# Patient Record
Sex: MALE | Race: WHITE | NOT HISPANIC OR LATINO | Employment: OTHER | URBAN - METROPOLITAN AREA
[De-identification: names, ages, dates, MRNs, and addresses within clinical notes are randomized per-mention and may not be internally consistent; named-entity substitution may affect disease eponyms.]

---

## 2017-01-19 DIAGNOSIS — R07.89 TIGHTNESS IN CHEST: Primary | ICD-10-CM

## 2017-01-23 ENCOUNTER — OFFICE VISIT (OUTPATIENT)
Dept: CARDIOLOGY | Facility: CLINIC | Age: 72
End: 2017-01-23
Payer: MEDICARE

## 2017-01-23 ENCOUNTER — PATIENT MESSAGE (OUTPATIENT)
Dept: CARDIOLOGY | Facility: CLINIC | Age: 72
End: 2017-01-23

## 2017-01-23 ENCOUNTER — HOSPITAL ENCOUNTER (OUTPATIENT)
Dept: CARDIOLOGY | Facility: CLINIC | Age: 72
Discharge: HOME | End: 2017-01-23
Payer: MEDICARE

## 2017-01-23 VITALS
WEIGHT: 258.38 LBS | BODY MASS INDEX: 36.17 KG/M2 | HEIGHT: 71 IN | HEART RATE: 69 BPM | SYSTOLIC BLOOD PRESSURE: 185 MMHG | DIASTOLIC BLOOD PRESSURE: 80 MMHG

## 2017-01-23 DIAGNOSIS — R07.89 CHEST TIGHTNESS: Primary | ICD-10-CM

## 2017-01-23 DIAGNOSIS — Z95.5 PRESENCE OF BARE METAL STENT IN RIGHT CORONARY ARTERY: ICD-10-CM

## 2017-01-23 DIAGNOSIS — I10 ESSENTIAL HYPERTENSION: ICD-10-CM

## 2017-01-23 DIAGNOSIS — Z95.820 S/P ANGIOPLASTY WITH STENT: ICD-10-CM

## 2017-01-23 DIAGNOSIS — R07.89 TIGHTNESS IN CHEST: ICD-10-CM

## 2017-01-23 DIAGNOSIS — I25.10 CORONARY ARTERY DISEASE, ANGINA PRESENCE UNSPECIFIED, UNSPECIFIED VESSEL OR LESION TYPE, UNSPECIFIED WHETHER NATIVE OR TRANSPLANTED HEART: ICD-10-CM

## 2017-01-23 PROCEDURE — 99213 OFFICE O/P EST LOW 20 MIN: CPT | Mod: PBBFAC | Performed by: INTERNAL MEDICINE

## 2017-01-23 PROCEDURE — 99205 OFFICE O/P NEW HI 60 MIN: CPT | Mod: S$PBB,,, | Performed by: INTERNAL MEDICINE

## 2017-01-23 PROCEDURE — 99999 PR PBB SHADOW E&M-EST. PATIENT-LVL III: CPT | Mod: PBBFAC,,, | Performed by: INTERNAL MEDICINE

## 2017-01-23 PROCEDURE — 93010 ELECTROCARDIOGRAM REPORT: CPT | Mod: S$PBB,,, | Performed by: INTERNAL MEDICINE

## 2017-01-23 RX ORDER — SIMVASTATIN 80 MG/1
80 TABLET, FILM COATED ORAL NIGHTLY
COMMUNITY
Start: 2016-12-07 | End: 2017-02-13

## 2017-01-23 RX ORDER — FLUTICASONE PROPIONATE 50 MCG
SPRAY, SUSPENSION (ML) NASAL
COMMUNITY

## 2017-01-23 RX ORDER — LISINOPRIL 40 MG/1
40 TABLET ORAL DAILY
COMMUNITY
Start: 2016-12-22

## 2017-01-23 RX ORDER — ALLOPURINOL 300 MG/1
300 TABLET ORAL DAILY
COMMUNITY
Start: 2016-12-22

## 2017-01-23 RX ORDER — ASPIRIN 81 MG/1
TABLET ORAL
COMMUNITY

## 2017-01-23 RX ORDER — ISOSORBIDE MONONITRATE 60 MG/1
60 TABLET, EXTENDED RELEASE ORAL DAILY
Qty: 30 TABLET | Refills: 11 | Status: SHIPPED | OUTPATIENT
Start: 2017-01-23 | End: 2017-01-23 | Stop reason: SDUPTHER

## 2017-01-23 RX ORDER — METOPROLOL SUCCINATE 50 MG/1
50 TABLET, EXTENDED RELEASE ORAL DAILY
COMMUNITY
Start: 2016-11-29 | End: 2017-01-23

## 2017-01-23 RX ORDER — ISOSORBIDE MONONITRATE 60 MG/1
60 TABLET, EXTENDED RELEASE ORAL DAILY
Qty: 90 TABLET | Refills: 3 | Status: SHIPPED | OUTPATIENT
Start: 2017-01-23 | End: 2017-02-13

## 2017-01-23 RX ORDER — FUROSEMIDE 20 MG/1
10 TABLET ORAL DAILY
COMMUNITY
Start: 2016-11-29

## 2017-01-23 RX ORDER — METOPROLOL SUCCINATE 100 MG/1
100 TABLET, EXTENDED RELEASE ORAL DAILY
Qty: 90 TABLET | Refills: 3 | Status: SHIPPED | OUTPATIENT
Start: 2017-01-23 | End: 2018-02-16 | Stop reason: SDUPTHER

## 2017-01-23 RX ORDER — METOPROLOL SUCCINATE 100 MG/1
100 TABLET, EXTENDED RELEASE ORAL DAILY
Qty: 30 TABLET | Refills: 11 | Status: SHIPPED | OUTPATIENT
Start: 2017-01-23 | End: 2017-01-23 | Stop reason: SDUPTHER

## 2017-01-23 RX ORDER — CETIRIZINE HYDROCHLORIDE 10 MG/1
TABLET ORAL
COMMUNITY

## 2017-01-23 NOTE — PROGRESS NOTES
"Subjective:   Patient ID:  Tristan Queen is a 71 y.o. male who presents for follow-up of Chest Pain (pressure x 3 month)    Prior:  Patient gets care at  [facility].  A 70-year-old male with history of PCI in . I stented his lai-ix-ibcyog right coronary artery with 3.5 x 12 mm and 3.0 x 8 mm non-drug-eluting stents. At that time, left anterior descending artery had mild plaquing. Circumflex system supplied only a small obtuse marginal branch, and thus, he was free of irregularities. Last perfusion imaging  that was negative for ischemia.     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 Previously retired from [employer]. He has been under increased stress over the past 1- 2 years, as his wife has cirrhosis, but is not yet on the liver transplant list. She is followed by [facility], but it has been determined if she subsequently needs a liver transplant, it will be completed at [facility].    HPI:   Patient at [facility] due to wife to receive liver kidney transplant.   Patient c/o chest tightness and feels like her heart is beating hard- especially when he does something exertional- like pushing her wife's wheel chair around. It has happened twice with exertion and other times it has happened at rest and sometimes seem to be worse when lying down, goes away on its own last 20-30 min. NTG has helped him. This has been going on for 2 months.   Patients BPs has gone since he has come here. It has increased. I reviewed all the BP on his phone 170-180s systolic.   EKG; SR with q waves inferior leads, no acute ST T changes.   Father, mother and brother all have had MI. (brother  of MI at age 52)  Non smoker.      There is no problem list on file for this patient.    Visit Vitals    BP (!) 185/80 (BP Location: Left arm, Patient Position: Sitting, BP Method: Automatic)    Pulse 69    Ht 5' 11" (1.803 m)    Wt 117.2 kg (258 lb 6.1 oz)    BMI 36.04 kg/m2     Body mass index is " 36.04 kg/(m^2).  CrCl cannot be calculated (Patient has no serum creatinine result on file.).    No results found for: NA, K, CL, CO2, BUN, CREATININE, GLU, HGBA1C, MG, AST, ALT, ALBUMIN, PROT, BILITOT, WBC, HGB, HCT, MCV, PLT, INR, PSA, TSH, CHOL, HDL, LDLCALC, TRIG    Current Outpatient Prescriptions   Medication Sig    allopurinol (ZYLOPRIM) 300 MG tablet Take 300 mg by mouth once daily.     aspirin (ECOTRIN) 81 MG EC tablet Take 81 mg by mouth daily       cetirizine (ZYRTEC) 10 MG tablet 10 mg daily Take by mouth daily prn    cholecalciferol, vitamin D3, 4,000 unit Cap Take 4,000 Units by mouth daily    fluticasone (FLONASE) 50 mcg/actuation nasal spray 2 sprays by Nasal route nightly       furosemide (LASIX) 20 MG tablet Take 10 mg by mouth once daily.     lisinopril (PRINIVIL,ZESTRIL) 40 MG tablet Take 20 mg by mouth once daily.     simvastatin (ZOCOR) 80 MG tablet Take 80 mg by mouth nightly.     isosorbide mononitrate (IMDUR) 60 MG 24 hr tablet Take 1 tablet (60 mg total) by mouth once daily.    metoprolol succinate (TOPROL-XL) 100 MG 24 hr tablet Take 1 tablet (100 mg total) by mouth once daily.     No current facility-administered medications for this visit.        Review of Systems   Constitution: Negative. Negative for chills, decreased appetite, weakness, malaise/fatigue, night sweats, weight gain and weight loss.   HENT: Positive for headaches.    Eyes: Negative.  Negative for blurred vision, double vision, visual disturbance and visual halos.   Cardiovascular: Positive for chest pain and palpitations. Negative for claudication, cyanosis, dyspnea on exertion, irregular heartbeat, leg swelling, near-syncope, orthopnea, paroxysmal nocturnal dyspnea and syncope.   Respiratory: Negative.  Negative for cough, hemoptysis, snoring, sputum production and wheezing.    Endocrine: Negative.  Negative for cold intolerance, heat intolerance, polydipsia and polyphagia.   Hematologic/Lymphatic: Negative for  adenopathy and bleeding problem. Does not bruise/bleed easily.   Skin: Negative.  Negative for flushing, itching, poor wound healing and rash.   Musculoskeletal: Negative.  Negative for arthritis, back pain, falls, gout, joint pain, joint swelling, muscle cramps, muscle weakness, myalgias, neck pain and stiffness.   Gastrointestinal: Negative for bloating, abdominal pain, anorexia, diarrhea, dysphagia, excessive appetite, flatus, hematemesis, jaundice, melena and nausea.   Genitourinary: Negative for hesitancy and incomplete emptying.   Neurological: Negative for aphonia, brief paralysis, difficulty with concentration, disturbances in coordination, excessive daytime sleepiness, dizziness, focal weakness, light-headedness and loss of balance.   Psychiatric/Behavioral: Negative for altered mental status, depression, hallucinations, hypervigilance, memory loss, substance abuse and suicidal ideas. The patient does not have insomnia and is not nervous/anxious.        Objective:   Physical Exam   Constitutional: He is oriented to person, place, and time. He appears well-developed and well-nourished. No distress.   HENT:   Head: Normocephalic and atraumatic.   Nose: Nose normal.   Mouth/Throat: No oropharyngeal exudate.   Eyes: Conjunctivae and EOM are normal. Pupils are equal, round, and reactive to light. Right eye exhibits no discharge. Left eye exhibits no discharge. No scleral icterus.   Neck: Normal range of motion. Neck supple. No JVD present. No tracheal deviation present. No thyromegaly present.   Cardiovascular: Normal rate, regular rhythm, normal heart sounds and intact distal pulses.  Exam reveals no gallop and no friction rub.    No murmur heard.  Pulmonary/Chest: Effort normal and breath sounds normal. No stridor. No respiratory distress. He has no wheezes. He has no rales. He exhibits no tenderness.   Abdominal: Soft. Bowel sounds are normal. He exhibits no distension and no mass. There is no tenderness.    Musculoskeletal: He exhibits no edema or tenderness.   Lymphadenopathy:     He has no cervical adenopathy.   Neurological: He is alert and oriented to person, place, and time. He displays normal reflexes. No cranial nerve deficit. He exhibits normal muscle tone. Coordination normal.   Skin: Skin is warm. No rash noted. He is not diaphoretic. No erythema. No pallor.   Psychiatric: He has a normal mood and affect. His behavior is normal. Judgment and thought content normal.       Assessment:     1. Chest tightness    2. S/P angioplasty with stent    3. Coronary artery disease, angina presence unspecified, unspecified vessel or lesion type, unspecified whether native or transplanted heart    4. Essential hypertension    5. Presence of bare metal stent in right coronary artery        Plan:   Tristan was seen today for chest pain.    Diagnoses and all orders for this visit:    Chest tightness  -     Discontinue: isosorbide mononitrate (IMDUR) 60 MG 24 hr tablet; Take 1 tablet (60 mg total) by mouth once daily.  -     isosorbide mononitrate (IMDUR) 60 MG 24 hr tablet; Take 1 tablet (60 mg total) by mouth once daily.    S/P angioplasty with stent  -     Cardiac PET Scan Stress; Future    Coronary artery disease, angina presence unspecified, unspecified vessel or lesion type, unspecified whether native or transplanted heart  -     Cardiac PET Scan Stress; Future    Essential hypertension  -     Discontinue: isosorbide mononitrate (IMDUR) 60 MG 24 hr tablet; Take 1 tablet (60 mg total) by mouth once daily.  -     Discontinue: metoprolol succinate (TOPROL-XL) 100 MG 24 hr tablet; Take 1 tablet (100 mg total) by mouth once daily.  -     metoprolol succinate (TOPROL-XL) 100 MG 24 hr tablet; Take 1 tablet (100 mg total) by mouth once daily.  -     isosorbide mononitrate (IMDUR) 60 MG 24 hr tablet; Take 1 tablet (60 mg total) by mouth once daily.    Presence of bare metal stent in right coronary artery      Patient to continue to  check his BP and start/increase the above meds. Since no stress test for many years and symptoms concerning fro cardiac disease, will pursue a stress test.  Exercise as tolerated.  All meds reviewed and are appropriate  Patient is compliant with his medications.      rtc 3 wks.

## 2017-01-23 NOTE — MR AVS SNAPSHOT
[facility]  [address]  [facility] [address]  Phone: [phone]                  Tristan Queen   2017 1:00 PM   Office Visit    Description:  Male : 1945   Provider:  Steffanie Nguyễn MD   Department:  [facility]           Reason for Visit     Chest Pain           Diagnoses this Visit        Comments    Chest tightness    -  Primary     S/P angioplasty with stent         Coronary artery disease, angina presence unspecified, unspecified vessel or lesion type, unspecified whether native or transplanted heart         Essential hypertension                To Do List           Goals (5 Years of Data)     None      Follow-Up and Disposition     Return in about 3 weeks (around 2017).       These Medications        Disp Refills Start End    metoprolol succinate (TOPROL-XL) 100 MG 24 hr tablet 90 tablet 3 2017    Take 1 tablet (100 mg total) by mouth once daily. - Oral    Pharmacy: [facility] [address] [place] [address] [address] [facility] Ph #: [phone]       isosorbide mononitrate (IMDUR) 60 MG 24 hr tablet 90 tablet 3 2017    Take 1 tablet (60 mg total) by mouth once daily. - Oral    Pharmacy: [facility] [address] [place] [address] [address] [facility] Ph #: [phone]         [facility] On Call     [facility] On Call Nurse Care Line -  Assistance  Registered nurses in the [facility] On Call Center provide clinical advisement, health education, appointment booking, and other advisory services.  Call for this free service at [phone].             Medications           Message regarding Medications     Verify the changes and/or additions to your medication regime listed below are the same as discussed with your clinician today.  If any of these changes or additions are incorrect, please notify your healthcare provider.        START taking these NEW medications        Refills    metoprolol succinate (TOPROL-XL) 100 MG 24 hr  "tablet 3    Sig: Take 1 tablet (100 mg total) by mouth once daily.    Class: Normal    Route: Oral    isosorbide mononitrate (IMDUR) 60 MG 24 hr tablet 3    Sig: Take 1 tablet (60 mg total) by mouth once daily.    Class: Normal    Route: Oral           Verify that the below list of medications is an accurate representation of the medications you are currently taking.  If none reported, the list may be blank. If incorrect, please contact your healthcare provider. Carry this list with you in case of emergency.           Current Medications     allopurinol (ZYLOPRIM) 300 MG tablet Take 300 mg by mouth once daily.     aspirin (ECOTRIN) 81 MG EC tablet Take 81 mg by mouth daily       cetirizine (ZYRTEC) 10 MG tablet 10 mg daily Take by mouth daily prn    cholecalciferol, vitamin D3, 4,000 unit Cap Take 4,000 Units by mouth daily    fluticasone (FLONASE) 50 mcg/actuation nasal spray 2 sprays by Nasal route nightly       furosemide (LASIX) 20 MG tablet Take 10 mg by mouth once daily.     lisinopril (PRINIVIL,ZESTRIL) 40 MG tablet Take 20 mg by mouth once daily.     simvastatin (ZOCOR) 80 MG tablet Take 80 mg by mouth nightly.     isosorbide mononitrate (IMDUR) 60 MG 24 hr tablet Take 1 tablet (60 mg total) by mouth once daily.    metoprolol succinate (TOPROL-XL) 100 MG 24 hr tablet Take 1 tablet (100 mg total) by mouth once daily.           Clinical Reference Information           Vital Signs - Last Recorded  Most recent update: 1/23/2017 12:54 PM by Elisa Brock MA    BP Pulse Ht Wt BMI    (!) 185/80 (BP Location: Left arm, Patient Position: Sitting, BP Method: Automatic) 69 5' 11" (1.803 m) 117.2 kg (258 lb 6.1 oz) 36.04 kg/m2      Blood Pressure          Most Recent Value    Right Arm BP - Sitting  182/86    Left Arm BP - Sitting  185/80    BP  (!)  185/80      Allergies as of 1/23/2017     No Known Allergies      Immunizations Administered on Date of Encounter - 1/23/2017     None      Orders Placed During Today's " Visit     Future Labs/Procedures Expected by Expires    Cardiac PET Scan Stress  As directed 1/23/2018      [facility] Sign-Up     Activating your [facility] account is as easy as 1-2-3!     1) Visit [email or web address], select Sign Up Now, enter this activation code and your date of birth, then select Next.  [email or web address]  Expires: 3/9/2017 12:45 PM      2) Create a username and password to use when you visit [facility] in the future and select a security question in case you lose your password and select Next.    3) Enter your e-mail address and click Sign Up!    Additional Information  If you have questions, please e-mail [email or web address] or call [phone] to talk to our [facility] staff. Remember, [facility] is NOT to be used for urgent needs. For medical emergencies, dial 911.

## 2017-01-30 ENCOUNTER — TELEPHONE (OUTPATIENT)
Dept: CARDIOLOGY | Facility: CLINIC | Age: 72
End: 2017-01-30

## 2017-01-30 NOTE — TELEPHONE ENCOUNTER
,          LOV:1/23/17.Pt said that ever since he started to take the Imdur 60 mg half tablet (30 mg) QD he has had only one episode of chest tightness at bed time & his b/p shot up to 190/90,he sat up and after 30 min he did feel better .He said that he has not had to use the Ntg but that he has been getting headaches since starting the Imdur and had to take Aleeve but this only provides a little relief.His b/p has been consistently 140's/ 75.He is taking the Toprol  mg QD along with the Lisinopril.Please advise.Thanks,Aditi

## 2017-01-30 NOTE — TELEPHONE ENCOUNTER
----- Message from Rory Fry sent at 1/30/2017  1:24 PM CST -----  Contact: patient  Please call pt at [phone]. Need clarification on IMDUR 60 mg. Last seen Dr Nguyễn 01/23/17    Thank you

## 2017-02-13 ENCOUNTER — OFFICE VISIT (OUTPATIENT)
Dept: CARDIOLOGY | Facility: CLINIC | Age: 72
End: 2017-02-13
Payer: MEDICARE

## 2017-02-13 ENCOUNTER — CLINICAL SUPPORT (OUTPATIENT)
Dept: CARDIOLOGY | Facility: CLINIC | Age: 72
End: 2017-02-13
Payer: MEDICARE

## 2017-02-13 VITALS
HEART RATE: 65 BPM | WEIGHT: 260.38 LBS | DIASTOLIC BLOOD PRESSURE: 81 MMHG | HEIGHT: 71 IN | BODY MASS INDEX: 36.45 KG/M2 | SYSTOLIC BLOOD PRESSURE: 184 MMHG

## 2017-02-13 DIAGNOSIS — Z95.5 PRESENCE OF BARE METAL STENT IN RIGHT CORONARY ARTERY: ICD-10-CM

## 2017-02-13 DIAGNOSIS — Z95.820 S/P ANGIOPLASTY WITH STENT: ICD-10-CM

## 2017-02-13 DIAGNOSIS — I25.10 CORONARY ARTERY DISEASE, ANGINA PRESENCE UNSPECIFIED, UNSPECIFIED VESSEL OR LESION TYPE, UNSPECIFIED WHETHER NATIVE OR TRANSPLANTED HEART: ICD-10-CM

## 2017-02-13 DIAGNOSIS — I10 ESSENTIAL HYPERTENSION: Primary | ICD-10-CM

## 2017-02-13 LAB — DIASTOLIC DYSFUNCTION: NO

## 2017-02-13 PROCEDURE — 93018 CV STRESS TEST I&R ONLY: CPT | Mod: S$PBB,,, | Performed by: INTERNAL MEDICINE

## 2017-02-13 PROCEDURE — 99215 OFFICE O/P EST HI 40 MIN: CPT | Mod: S$PBB,,, | Performed by: INTERNAL MEDICINE

## 2017-02-13 PROCEDURE — 99999 PR PBB SHADOW E&M-EST. PATIENT-LVL III: CPT | Mod: PBBFAC,,, | Performed by: INTERNAL MEDICINE

## 2017-02-13 PROCEDURE — 99213 OFFICE O/P EST LOW 20 MIN: CPT | Mod: PBBFAC,27 | Performed by: INTERNAL MEDICINE

## 2017-02-13 PROCEDURE — 99999 PR PBB SHADOW E&M-EST. PATIENT-LVL II: CPT | Mod: PBBFAC,,,

## 2017-02-13 PROCEDURE — 78492 MYOCRD IMG PET MLT RST&STRS: CPT | Mod: 26,S$PBB,, | Performed by: INTERNAL MEDICINE

## 2017-02-13 PROCEDURE — 93016 CV STRESS TEST SUPVJ ONLY: CPT | Mod: S$PBB,,, | Performed by: INTERNAL MEDICINE

## 2017-02-13 RX ORDER — ATORVASTATIN CALCIUM 80 MG/1
80 TABLET, FILM COATED ORAL DAILY
Qty: 90 TABLET | Refills: 3 | Status: SHIPPED | OUTPATIENT
Start: 2017-02-13 | End: 2017-02-13 | Stop reason: SDUPTHER

## 2017-02-13 RX ORDER — AMLODIPINE BESYLATE 10 MG/1
10 TABLET ORAL DAILY
Qty: 90 TABLET | Refills: 3 | Status: SHIPPED | OUTPATIENT
Start: 2017-02-13 | End: 2018-02-13

## 2017-02-13 RX ORDER — ATORVASTATIN CALCIUM 80 MG/1
80 TABLET, FILM COATED ORAL DAILY
Qty: 90 TABLET | Refills: 3 | Status: SHIPPED | OUTPATIENT
Start: 2017-02-13 | End: 2018-02-13

## 2017-02-13 RX ORDER — AMLODIPINE BESYLATE 10 MG/1
10 TABLET ORAL DAILY
Qty: 90 TABLET | Refills: 3 | Status: SHIPPED | OUTPATIENT
Start: 2017-02-13 | End: 2017-02-13 | Stop reason: SDUPTHER

## 2017-02-13 NOTE — MR AVS SNAPSHOT
[facility]  [address]  [facility] [address]  Phone: [phone]                  Tristan Queen   2017 11:00 AM   Office Visit    Description:  Male : 1945   Provider:  Steffanie Nguyễn MD   Department:  [facility]           Reason for Visit     Chest Pain           Diagnoses this Visit        Comments    Essential hypertension    -  Primary            To Do List           Future Appointments        Provider Department Dept Phone    2017 11:00 AM MD [facility] [phone]      Goals (5 Years of Data)     None      Follow-Up and Disposition     Return in about 4 weeks (around 3/13/2017).       These Medications        Disp Refills Start End    atorvastatin (LIPITOR) 80 MG tablet 90 tablet 3 2017    Take 1 tablet (80 mg total) by mouth once daily. - Oral    Pharmacy: [facility] [address] [place] [address] [address] Ph #: [phone]       amlodipine (NORVASC) 10 MG tablet 90 tablet 3 2017    Take 1 tablet (10 mg total) by mouth once daily. - Oral    Pharmacy: [facility] [address] [place] [address] [address] [facility] Ph #: [phone]         [facility] On Call     [facility] On Call Nurse Care Line -  Assistance  Registered nurses in the [facility] On Call Center provide clinical advisement, health education, appointment booking, and other advisory services.  Call for this free service at [phone].             Medications           Message regarding Medications     Verify the changes and/or additions to your medication regime listed below are the same as discussed with your clinician today.  If any of these changes or additions are incorrect, please notify your healthcare provider.        START taking these NEW medications        Refills    atorvastatin (LIPITOR) 80 MG tablet 3    Sig: Take 1 tablet (80 mg total) by mouth once daily.    Class: Normal    Route: Oral    amlodipine  "(NORVASC) 10 MG tablet 3    Sig: Take 1 tablet (10 mg total) by mouth once daily.    Class: Normal    Route: Oral      STOP taking these medications     isosorbide mononitrate (IMDUR) 60 MG 24 hr tablet Take 1 tablet (60 mg total) by mouth once daily.    simvastatin (ZOCOR) 80 MG tablet Take 80 mg by mouth nightly.            Verify that the below list of medications is an accurate representation of the medications you are currently taking.  If none reported, the list may be blank. If incorrect, please contact your healthcare provider. Carry this list with you in case of emergency.           Current Medications     allopurinol (ZYLOPRIM) 300 MG tablet Take 300 mg by mouth once daily.     amlodipine (NORVASC) 10 MG tablet Take 1 tablet (10 mg total) by mouth once daily.    aspirin (ECOTRIN) 81 MG EC tablet Take 81 mg by mouth daily       atorvastatin (LIPITOR) 80 MG tablet Take 1 tablet (80 mg total) by mouth once daily.    cetirizine (ZYRTEC) 10 MG tablet 10 mg daily Take by mouth daily prn    cholecalciferol, vitamin D3, 4,000 unit Cap Take 4,000 Units by mouth daily    fluticasone (FLONASE) 50 mcg/actuation nasal spray 2 sprays by Nasal route nightly       furosemide (LASIX) 20 MG tablet Take 10 mg by mouth once daily.     lisinopril (PRINIVIL,ZESTRIL) 40 MG tablet Take 40 mg by mouth once daily.     metoprolol succinate (TOPROL-XL) 100 MG 24 hr tablet Take 1 tablet (100 mg total) by mouth once daily.           Clinical Reference Information           Your Vitals Were     BP Pulse Height Weight BMI    184/81 (BP Location: Left arm, Patient Position: Sitting, BP Method: Automatic) 65 5' 11" (1.803 m) 118.1 kg (260 lb 5.8 oz) 36.31 kg/m2      Blood Pressure          Most Recent Value    Right Arm BP - Sitting  179/85    Left Arm BP - Sitting  184/81    BP  (!)  184/81      Allergies as of 2/13/2017     No Known Allergies      Immunizations Administered on Date of Encounter - 2/13/2017     None      Language " Assistance Services     ATTENTION: Language assistance services are available, free of charge. Please call [phone].      ATENCIÓN: Si habla español, tiene a dougherty disposición servicios gratuitos de asistencia lingüística. Llame al [phone].     CHÚ Ý: N?u b?n nói Ti?ng Vi?t, có các d?ch v? h? tr? ngôn ng? mi?n phí dành cho b?n. G?i s? [phone].         [facility] complies with applicable Federal civil rights laws and does not discriminate on the basis of race, color, national origin, age, disability, or sex.

## 2017-02-13 NOTE — PROGRESS NOTES
Subjective:   Patient ID:  Tristan Queen is a 71 y.o. male who presents for follow-up of Chest Pain (3 weeks fu)    A 70-year-old male with history of PCI in . I stented his [id number] right coronary artery with 3.5 x 12 mm and 3.0 x 8 mm non-drug-eluting stents. At that time, left anterior descending artery had mild plaquing. Circumflex system supplied only a small obtuse marginal branch, and thus, he was free of irregularities. Last perfusion imaging  that was negative for ischemia.     He is now retired completely from [employer]. Previously retired from [employer]. He has been under increased stress over the past 1- 2 years, as his wife has cirrhosis, but is not yet on the liver transplant list. She is followed by [facility], but it has been determined if she subsequently needs a liver transplant, it will be completed at [facility].     Patient at [facility] due to wife to receive liver kidney transplant.   Patient c/o chest tightness and feels like her heart is beating hard- especially when he does something exertional- like pushing her wife's wheel chair around. It has happened twice with exertion and other times it has happened at rest and sometimes seem to be worse when lying down, goes away on its own last 20-30 min. NTG has helped him. This has been going on for 2 months.   Patients BPs has gone since he has come here. It has increased. I reviewed all the BP on his phone 170-180s systolic.   EKG; SR with q waves inferior leads, no acute ST T changes.   Father, mother and brother all have had MI. (brother  of MI at age 52)  Non smoker.     HPI:   Chest pain appears better thinks it is stress related. PET stress done today, result is pending.  Patient brought in his BP diary and his BP has been as high as 180s.     There is no problem list on file for this patient.    Visit Vitals    BP (!) 184/81 (BP Location: Left arm, Patient Position: Sitting, BP Method: Automatic)    Pulse 65    Ht  "5' 11" (1.803 m)    Wt 118.1 kg (260 lb 5.8 oz)    BMI 36.31 kg/m2     Body mass index is 36.31 kg/(m^2).  CrCl cannot be calculated (Patient has no serum creatinine result on file.).    No results found for: NA, K, CL, CO2, BUN, CREATININE, GLU, HGBA1C, MG, AST, ALT, ALBUMIN, PROT, BILITOT, WBC, HGB, HCT, MCV, PLT, INR, PSA, TSH, CHOL, HDL, LDLCALC, TRIG    Current Outpatient Prescriptions   Medication Sig    allopurinol (ZYLOPRIM) 300 MG tablet Take 300 mg by mouth once daily.     aspirin (ECOTRIN) 81 MG EC tablet Take 81 mg by mouth daily       cetirizine (ZYRTEC) 10 MG tablet 10 mg daily Take by mouth daily prn    cholecalciferol, vitamin D3, 4,000 unit Cap Take 4,000 Units by mouth daily    fluticasone (FLONASE) 50 mcg/actuation nasal spray 2 sprays by Nasal route nightly       furosemide (LASIX) 20 MG tablet Take 10 mg by mouth once daily.     lisinopril (PRINIVIL,ZESTRIL) 40 MG tablet Take 40 mg by mouth once daily.     metoprolol succinate (TOPROL-XL) 100 MG 24 hr tablet Take 1 tablet (100 mg total) by mouth once daily.    amlodipine (NORVASC) 10 MG tablet Take 1 tablet (10 mg total) by mouth once daily.    atorvastatin (LIPITOR) 80 MG tablet Take 1 tablet (80 mg total) by mouth once daily.     No current facility-administered medications for this visit.        Review of Systems   Constitution: Negative. Negative for chills, decreased appetite, weakness, malaise/fatigue, night sweats, weight gain and weight loss.   HENT: Positive for headaches.    Eyes: Negative.  Negative for blurred vision, double vision, visual disturbance and visual halos.   Cardiovascular: Positive for chest pain and palpitations. Negative for claudication, cyanosis, dyspnea on exertion, irregular heartbeat, leg swelling, near-syncope, orthopnea, paroxysmal nocturnal dyspnea and syncope.   Respiratory: Negative.  Negative for cough, hemoptysis, snoring, sputum production and wheezing.    Endocrine: Negative.  Negative for " cold intolerance, heat intolerance, polydipsia and polyphagia.   Hematologic/Lymphatic: Negative for adenopathy and bleeding problem. Does not bruise/bleed easily.   Skin: Negative.  Negative for flushing, itching, poor wound healing and rash.   Musculoskeletal: Negative.  Negative for arthritis, back pain, falls, gout, joint pain, joint swelling, muscle cramps, muscle weakness, myalgias, neck pain and stiffness.   Gastrointestinal: Negative for bloating, abdominal pain, anorexia, diarrhea, dysphagia, excessive appetite, flatus, hematemesis, jaundice, melena and nausea.   Genitourinary: Negative for hesitancy and incomplete emptying.   Neurological: Negative for aphonia, brief paralysis, difficulty with concentration, disturbances in coordination, excessive daytime sleepiness, dizziness, focal weakness, light-headedness and loss of balance.   Psychiatric/Behavioral: Negative for altered mental status, depression, hallucinations, hypervigilance, memory loss, substance abuse and suicidal ideas. The patient does not have insomnia and is not nervous/anxious.        Objective:   Physical Exam   Constitutional: He is oriented to person, place, and time. He appears well-developed and well-nourished. No distress.   HENT:   Head: Normocephalic and atraumatic.   Nose: Nose normal.   Mouth/Throat: No oropharyngeal exudate.   Eyes: Conjunctivae and EOM are normal. Pupils are equal, round, and reactive to light. Right eye exhibits no discharge. Left eye exhibits no discharge. No scleral icterus.   Neck: Normal range of motion. Neck supple. No JVD present. No tracheal deviation present. No thyromegaly present.   Cardiovascular: Normal rate, regular rhythm, normal heart sounds and intact distal pulses.  Exam reveals no gallop and no friction rub.    No murmur heard.  Pulmonary/Chest: Effort normal and breath sounds normal. No stridor. No respiratory distress. He has no wheezes. He has no rales. He exhibits no tenderness.    Abdominal: Soft. Bowel sounds are normal. He exhibits no distension and no mass. There is no tenderness.   Musculoskeletal: He exhibits no edema or tenderness.   Lymphadenopathy:     He has no cervical adenopathy.   Neurological: He is alert and oriented to person, place, and time. He displays normal reflexes. No cranial nerve deficit. He exhibits normal muscle tone. Coordination normal.   Skin: Skin is warm. No rash noted. He is not diaphoretic. No erythema. No pallor.   Psychiatric: He has a normal mood and affect. His behavior is normal. Judgment and thought content normal.       Assessment:     1. Essential hypertension    2. Presence of bare metal stent in right coronary artery    3. S/P angioplasty with stent    4. Coronary artery disease, angina presence unspecified, unspecified vessel or lesion type, unspecified whether native or transplanted heart        Plan:   Tristan was seen today for chest pain.    Diagnoses and all orders for this visit:    Essential hypertension  -     Discontinue: amlodipine (NORVASC) 10 MG tablet; Take 1 tablet (10 mg total) by mouth once daily.  -     amlodipine (NORVASC) 10 MG tablet; Take 1 tablet (10 mg total) by mouth once daily.    Presence of bare metal stent in right coronary artery    S/P angioplasty with stent    Coronary artery disease, angina presence unspecified, unspecified vessel or lesion type, unspecified whether native or transplanted heart    Other orders  -     Discontinue: atorvastatin (LIPITOR) 80 MG tablet; Take 1 tablet (80 mg total) by mouth once daily.  -     atorvastatin (LIPITOR) 80 MG tablet; Take 1 tablet (80 mg total) by mouth once daily.      Suspect HTN to be the main etiology of his chest pain.   Counseled on importance of heart healthy diet low in saturated and trans fat and salt as well gradually starting a regular aerobic exercise regimen with goal of 30min 5x/week. Recommend BP diary. Call if systolic BP > 140 mmHg on checking repeatedly  All  meds reviewed and are appropriate  Patient is compliant with his medications.      RTC in 4 wks for HTN follow up.

## 2017-02-14 ENCOUNTER — TELEPHONE (OUTPATIENT)
Dept: CARDIOLOGY | Facility: CLINIC | Age: 72
End: 2017-02-14

## 2017-02-14 NOTE — PROGRESS NOTES
Please let pt. Know that the stress test was normal. There are no new blocakges. Chest pain is most likely related to high BP.

## 2017-02-14 NOTE — TELEPHONE ENCOUNTER
----- Message from Steffanie Nguyễn MD sent at 2/14/2017  1:47 PM CST -----  Please let pt. Know that the stress test was normal. There are no new blocakges. Chest pain is most likely related to high BP.

## 2017-03-22 ENCOUNTER — OFFICE VISIT (OUTPATIENT)
Dept: CARDIOLOGY | Facility: CLINIC | Age: 72
End: 2017-03-22
Payer: MEDICARE

## 2017-03-22 VITALS
HEART RATE: 67 BPM | SYSTOLIC BLOOD PRESSURE: 124 MMHG | WEIGHT: 260.13 LBS | DIASTOLIC BLOOD PRESSURE: 67 MMHG | HEIGHT: 71 IN | BODY MASS INDEX: 36.42 KG/M2

## 2017-03-22 DIAGNOSIS — Z12.11 SCREENING FOR COLON CANCER: Primary | ICD-10-CM

## 2017-03-22 DIAGNOSIS — I20.9 ANGINA PECTORIS: ICD-10-CM

## 2017-03-22 DIAGNOSIS — I25.10 CORONARY ARTERY DISEASE, ANGINA PRESENCE UNSPECIFIED, UNSPECIFIED VESSEL OR LESION TYPE, UNSPECIFIED WHETHER NATIVE OR TRANSPLANTED HEART: ICD-10-CM

## 2017-03-22 DIAGNOSIS — R07.89 CHEST TIGHTNESS: ICD-10-CM

## 2017-03-22 DIAGNOSIS — Z95.5 PRESENCE OF BARE METAL STENT IN RIGHT CORONARY ARTERY: ICD-10-CM

## 2017-03-22 DIAGNOSIS — I10 ESSENTIAL HYPERTENSION: ICD-10-CM

## 2017-03-22 DIAGNOSIS — E78.00 HYPERCHOLESTEREMIA: ICD-10-CM

## 2017-03-22 DIAGNOSIS — Z13.1 DIABETES MELLITUS SCREENING: ICD-10-CM

## 2017-03-22 DIAGNOSIS — Z95.820 S/P ANGIOPLASTY WITH STENT: ICD-10-CM

## 2017-03-22 DIAGNOSIS — R07.89 TIGHTNESS IN CHEST: ICD-10-CM

## 2017-03-22 PROCEDURE — 99215 OFFICE O/P EST HI 40 MIN: CPT | Mod: S$PBB,,, | Performed by: INTERNAL MEDICINE

## 2017-03-22 PROCEDURE — 99999 PR PBB SHADOW E&M-EST. PATIENT-LVL III: CPT | Mod: PBBFAC,,, | Performed by: INTERNAL MEDICINE

## 2017-03-22 PROCEDURE — 99213 OFFICE O/P EST LOW 20 MIN: CPT | Mod: PBBFAC | Performed by: INTERNAL MEDICINE

## 2017-03-22 NOTE — MR AVS SNAPSHOT
[facility]  [address]  [facility] [address]  Phone: [phone]                  Tristan Queen   3/22/2017 3:30 PM   Office Visit    Description:  Male : 1945   Provider:  Steffanie Nguyễn MD   Department:  [facility]           Reason for Visit     Hypertension           Diagnoses this Visit        Comments    Screening for colon cancer    -  Primary     Essential hypertension         Presence of bare metal stent in right coronary artery         S/P angioplasty with stent         Coronary artery disease, angina presence unspecified, unspecified vessel or lesion type, unspecified whether native or transplanted heart         Tightness in chest         Chest tightness         Hypercholesteremia         Angina pectoris         Diabetes mellitus screening         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      [facility]     [facility] - 24/7 Assistance  Registered nurses in the [facility] provide clinical advisement, health education, appointment booking, and other advisory services.  Call for this free service at [phone].             Medications           Message regarding Medications     Verify the changes and/or additions to your medication regime listed below are the same as discussed with your clinician today.  If any of these changes or additions are incorrect, please notify your healthcare provider.             Verify that the below list of medications is an accurate representation of the medications you are currently taking.  If none reported, the list may be blank. If incorrect, please contact your healthcare provider. Carry this list with you in case of emergency.           Current  "Medications     allopurinol (ZYLOPRIM) 300 MG tablet Take 300 mg by mouth once daily.     amlodipine (NORVASC) 10 MG tablet Take 1 tablet (10 mg total) by mouth once daily.    aspirin (ECOTRIN) 81 MG EC tablet Take 81 mg by mouth daily       atorvastatin (LIPITOR) 80 MG tablet Take 1 tablet (80 mg total) by mouth once daily.    cetirizine (ZYRTEC) 10 MG tablet 10 mg daily Take by mouth daily prn    cholecalciferol, vitamin D3, 4,000 unit Cap Take 4,000 Units by mouth daily    fluticasone (FLONASE) 50 mcg/actuation nasal spray 2 sprays by Nasal route nightly       furosemide (LASIX) 20 MG tablet Take 10 mg by mouth once daily.     lisinopril (PRINIVIL,ZESTRIL) 40 MG tablet Take 40 mg by mouth once daily.     metoprolol succinate (TOPROL-XL) 100 MG 24 hr tablet Take 1 tablet (100 mg total) by mouth once daily.           Clinical Reference Information           Your Vitals Were     BP Pulse Height Weight BMI    124/67 (BP Location: Left arm, Patient Position: Sitting, BP Method: Automatic) 67 5' 11" (1.803 m) 118 kg (260 lb 2.3 oz) 36.28 kg/m2      Blood Pressure          Most Recent Value    Right Arm BP - Sitting  117/64    Left Arm BP - Sitting  124/67    BP  124/67      Allergies as of 3/22/2017     No Known Allergies      Immunizations Administered on Date of Encounter - 3/22/2017     None      Orders Placed During Today's Visit      Normal Orders This Visit    Ambulatory Referral to Gastroenterology     POCT HEMOGLOBIN A1C     Future Labs/Procedures Expected by Expires    Basic metabolic panel  3/22/2017 5/21/2018    CBC auto differential  3/22/2017 5/21/2018    Lipid panel  3/22/2017 5/21/2018      Language Assistance Services     ATTENTION: Language assistance services are available, free of charge. Please call [phone].      ATENCIÓN: Si habla chery, tiene a dougherty disposición servicios gratuitos de asistencia lingüística. Llame al [phone].     CHÚ Ý: N?u b?n nói Ti?ng Vi?t, có các d?ch v? h? tr? " lori marx? mi?n phí dành cho b?n. G?i s? [phone].         [facility] complies with applicable Federal civil rights laws and does not discriminate on the basis of race, color, national origin, age, disability, or sex.

## 2017-03-22 NOTE — PROGRESS NOTES
Subjective:   Patient ID:  Tristan Queen is a 71 y.o. male who presents for follow-up of Hypertension (4 week f/u )  A 70-year-old male with history of PCI in . I stented his [id number] right coronary artery with 3.5 x 12 mm and 3.0 x 8 mm non-drug-eluting stents. At that time, left anterior descending artery had mild plaquing. Circumflex system supplied only a small obtuse marginal branch, and thus, he was free of irregularities. Last perfusion imaging  that was negative for ischemia.     He is now retired completely from [employer]. Previously retired from [employer]. He has been under increased stress over the past 1- 2 years, as his wife has cirrhosis, but is not yet on the liver transplant list. She is followed by [facility], but it has been determined if she subsequently needs a liver transplant, it will be completed at [facility].     Patient at [facility] due to wife to receive liver kidney transplant.   Patient c/o chest tightness and feels like her heart is beating hard- especially when he does something exertional- like pushing her wife's wheel chair around. It has happened twice with exertion and other times it has happened at rest and sometimes seem to be worse when lying down, goes away on its own last 20-30 min. NTG has helped him. This has been going on for 2 months.   Patients BPs has gone since he has come here. It has increased. I reviewed all the BP on his phone 170-180s systolic.   EKG; SR with q waves inferior leads, no acute ST T changes.   Father, mother and brother all have had MI. (brother  of MI at age 52)  Non smoker.    PET stress    1. There is no evidence of a discrete hemodynamically significant coronary stenosis.   2. Although no clinically significant stress defect is seen, there is resting flow heterogeneity that improves after Dipyridamole in addition to reduced coronary flow reserve. These results suggest mild endothelial dysfunction due to high blood pressure  "  and mild, diffuse, non-obstructive coronary atherosclerosis without clinically significant, localized perfusion defects.   3. Resting wall motion is physiologic. Stress wall motion is physiologic.   4. LV function is normal at rest and stress.  (normal is >= 51%)  5. The ventricular volumes are normal at rest and stress.   6. The extracardiac distribution of radioactivity is normal.   7. There was no previous study available to compare.     HPI:   BP much improved since Norvasc. Occasional 140 reading rarely. Chest pain resolved after HTN treated.     There is no problem list on file for this patient.    /67 (BP Location: Left arm, Patient Position: Sitting, BP Method: Automatic)  Pulse 67  Ht 5' 11" (1.803 m)  Wt 118 kg (260 lb 2.3 oz)  BMI 36.28 kg/m2  Body mass index is 36.28 kg/(m^2).  CrCl cannot be calculated (Patient has no serum creatinine result on file.).    No results found for: NA, K, CL, CO2, BUN, CREATININE, GLU, HGBA1C, MG, AST, ALT, ALBUMIN, PROT, BILITOT, WBC, HGB, HCT, MCV, PLT, INR, PSA, TSH, CHOL, HDL, LDLCALC, TRIG    Current Outpatient Prescriptions   Medication Sig    allopurinol (ZYLOPRIM) 300 MG tablet Take 300 mg by mouth once daily.     amlodipine (NORVASC) 10 MG tablet Take 1 tablet (10 mg total) by mouth once daily.    aspirin (ECOTRIN) 81 MG EC tablet Take 81 mg by mouth daily       atorvastatin (LIPITOR) 80 MG tablet Take 1 tablet (80 mg total) by mouth once daily.    cetirizine (ZYRTEC) 10 MG tablet 10 mg daily Take by mouth daily prn    cholecalciferol, vitamin D3, 4,000 unit Cap Take 4,000 Units by mouth daily    fluticasone (FLONASE) 50 mcg/actuation nasal spray 2 sprays by Nasal route nightly       furosemide (LASIX) 20 MG tablet Take 10 mg by mouth once daily.     lisinopril (PRINIVIL,ZESTRIL) 40 MG tablet Take 40 mg by mouth once daily.     metoprolol succinate (TOPROL-XL) 100 MG 24 hr tablet Take 1 tablet (100 mg total) by mouth once daily.     No current " facility-administered medications for this visit.        Review of Systems   Constitution: Negative. Negative for chills, decreased appetite, weakness, malaise/fatigue, night sweats, weight gain and weight loss.   Eyes: Negative.  Negative for blurred vision, double vision, visual disturbance and visual halos.   Cardiovascular: Negative for claudication, cyanosis, dyspnea on exertion, irregular heartbeat, leg swelling, near-syncope, orthopnea, paroxysmal nocturnal dyspnea and syncope.   Respiratory: Negative.  Negative for cough, hemoptysis, snoring, sputum production and wheezing.    Endocrine: Negative.  Negative for cold intolerance, heat intolerance, polydipsia and polyphagia.   Hematologic/Lymphatic: Negative for adenopathy and bleeding problem. Does not bruise/bleed easily.   Skin: Negative.  Negative for flushing, itching, poor wound healing and rash.   Musculoskeletal: Negative.  Negative for arthritis, back pain, falls, gout, joint pain, joint swelling, muscle cramps, muscle weakness, myalgias, neck pain and stiffness.   Gastrointestinal: Negative for bloating, abdominal pain, anorexia, diarrhea, dysphagia, excessive appetite, flatus, hematemesis, jaundice, melena and nausea.   Genitourinary: Negative for hesitancy and incomplete emptying.   Neurological: Negative for aphonia, brief paralysis, difficulty with concentration, disturbances in coordination, excessive daytime sleepiness, dizziness, focal weakness, light-headedness and loss of balance.   Psychiatric/Behavioral: Negative for altered mental status, depression, hallucinations, hypervigilance, memory loss, substance abuse and suicidal ideas. The patient does not have insomnia and is not nervous/anxious.        Objective:   Physical Exam   Constitutional: He is oriented to person, place, and time. He appears well-developed and well-nourished. No distress.   HENT:   Head: Normocephalic and atraumatic.   Nose: Nose normal.   Mouth/Throat: No  oropharyngeal exudate.   Eyes: Conjunctivae and EOM are normal. Pupils are equal, round, and reactive to light. Right eye exhibits no discharge. Left eye exhibits no discharge. No scleral icterus.   Neck: Normal range of motion. Neck supple. No JVD present. No tracheal deviation present. No thyromegaly present.   Cardiovascular: Normal rate, regular rhythm, normal heart sounds and intact distal pulses.  Exam reveals no gallop and no friction rub.    No murmur heard.  Pulmonary/Chest: Effort normal and breath sounds normal. No stridor. No respiratory distress. He has no wheezes. He has no rales. He exhibits no tenderness.   Abdominal: Soft. Bowel sounds are normal. He exhibits no distension and no mass. There is no tenderness.   Musculoskeletal: He exhibits no edema or tenderness.   Lymphadenopathy:     He has no cervical adenopathy.   Neurological: He is alert and oriented to person, place, and time. He displays normal reflexes. No cranial nerve deficit. He exhibits normal muscle tone. Coordination normal.   Skin: Skin is warm. No rash noted. He is not diaphoretic. No erythema. No pallor.   Psychiatric: He has a normal mood and affect. His behavior is normal. Judgment and thought content normal.       Assessment:     1. Screening for colon cancer    2. Essential hypertension    3. Presence of bare metal stent in right coronary artery    4. S/P angioplasty with stent    5. Coronary artery disease, angina presence unspecified, unspecified vessel or lesion type, unspecified whether native or transplanted heart    6. Tightness in chest    7. Chest tightness    8. Hypercholesteremia    9. Angina pectoris     10. Diabetes mellitus screening    11. 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    Diagnoses and all orders for this visit:    Screening for colon cancer  -     Ambulatory  Referral to Gastroenterology    Essential hypertension    Presence of bare metal stent in right coronary artery    S/P angioplasty with stent  -     Lipid panel; Future  -     Ambulatory Referral to Gastroenterology    Coronary artery disease, angina presence unspecified, unspecified vessel or lesion type, unspecified whether native or transplanted heart    Tightness in chest    Chest tightness  -     CBC auto differential; Future    Hypercholesteremia  -     Basic metabolic panel; Future    Angina pectoris   -     Lipid panel; Future    Diabetes mellitus screening  -     POCT HEMOGLOBIN A1C    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 Recommend BP diary. Call if systolic BP > 140 mmHg on checking repeatedly  All meds reviewed and are appropriate  Patient is compliant with his medications.      RTC in 8 months.

## 2017-03-23 ENCOUNTER — LAB VISIT (OUTPATIENT)
Dept: LAB | Facility: HOSPITAL | Age: 72
End: 2017-03-23
Attending: INTERNAL MEDICINE
Payer: MEDICARE

## 2017-03-23 DIAGNOSIS — I20.9 ANGINA PECTORIS: ICD-10-CM

## 2017-03-23 DIAGNOSIS — E78.00 HYPERCHOLESTEREMIA: ICD-10-CM

## 2017-03-23 DIAGNOSIS — Z95.820 S/P ANGIOPLASTY WITH STENT: ICD-10-CM

## 2017-03-23 DIAGNOSIS — R07.89 CHEST TIGHTNESS: ICD-10-CM

## 2017-03-23 LAB
ANION GAP SERPL CALC-SCNC: 9 MMOL/L
BASOPHILS # BLD AUTO: 0.04 K/UL
BASOPHILS NFR BLD: 0.6 %
BUN SERPL-MCNC: 22 MG/DL
CALCIUM SERPL-MCNC: 9.8 MG/DL
CHLORIDE SERPL-SCNC: 105 MMOL/L
CHOLEST/HDLC SERPL: 4.3 {RATIO}
CO2 SERPL-SCNC: 26 MMOL/L
CREAT SERPL-MCNC: 1.2 MG/DL
DIFFERENTIAL METHOD: ABNORMAL
EOSINOPHIL # BLD AUTO: 0.5 K/UL
EOSINOPHIL NFR BLD: 7 %
ERYTHROCYTE [DISTWIDTH] IN BLOOD BY AUTOMATED COUNT: 13.3 %
EST. GFR  (AFRICAN AMERICAN): >60 ML/MIN/1.73 M^2
EST. GFR  (NON AFRICAN AMERICAN): >60 ML/MIN/1.73 M^2
GLUCOSE SERPL-MCNC: 126 MG/DL
HCT VFR BLD AUTO: 46.4 %
HDL/CHOLESTEROL RATIO: 23.1 %
HDLC SERPL-MCNC: 160 MG/DL
HDLC SERPL-MCNC: 37 MG/DL
HGB BLD-MCNC: 16.8 G/DL
LDLC SERPL CALC-MCNC: 75.4 MG/DL
LYMPHOCYTES # BLD AUTO: 2.2 K/UL
LYMPHOCYTES NFR BLD: 30.3 %
MCH RBC QN AUTO: 32.6 PG
MCHC RBC AUTO-ENTMCNC: 36.2 %
MCV RBC AUTO: 90 FL
MONOCYTES # BLD AUTO: 0.8 K/UL
MONOCYTES NFR BLD: 10.5 %
NEUTROPHILS # BLD AUTO: 3.7 K/UL
NEUTROPHILS NFR BLD: 50.6 %
NONHDLC SERPL-MCNC: 123 MG/DL
PLATELET # BLD AUTO: 184 K/UL
PMV BLD AUTO: 10.4 FL
POTASSIUM SERPL-SCNC: 4.7 MMOL/L
RBC # BLD AUTO: 5.15 M/UL
SODIUM SERPL-SCNC: 140 MMOL/L
TRIGL SERPL-MCNC: 238 MG/DL
WBC # BLD AUTO: 7.26 K/UL

## 2017-03-23 PROCEDURE — 85025 COMPLETE CBC W/AUTO DIFF WBC: CPT

## 2017-03-23 PROCEDURE — 80048 BASIC METABOLIC PNL TOTAL CA: CPT

## 2017-03-23 PROCEDURE — 36415 COLL VENOUS BLD VENIPUNCTURE: CPT

## 2017-03-23 PROCEDURE — 80061 LIPID PANEL: CPT

## 2017-03-28 ENCOUNTER — OFFICE VISIT (OUTPATIENT)
Dept: GASTROENTEROLOGY | Facility: CLINIC | Age: 72
End: 2017-03-28
Payer: MEDICARE

## 2017-03-28 ENCOUNTER — TELEPHONE (OUTPATIENT)
Dept: ENDOSCOPY | Facility: HOSPITAL | Age: 72
End: 2017-03-28

## 2017-03-28 VITALS
BODY MASS INDEX: 36.11 KG/M2 | SYSTOLIC BLOOD PRESSURE: 118 MMHG | WEIGHT: 257.94 LBS | HEIGHT: 71 IN | HEART RATE: 58 BPM | DIASTOLIC BLOOD PRESSURE: 74 MMHG

## 2017-03-28 DIAGNOSIS — Z12.11 SPECIAL SCREENING FOR MALIGNANT NEOPLASMS, COLON: Primary | ICD-10-CM

## 2017-03-28 DIAGNOSIS — Z12.11 SCREEN FOR COLON CANCER: Primary | ICD-10-CM

## 2017-03-28 PROCEDURE — 99499 UNLISTED E&M SERVICE: CPT | Mod: S$PBB,,, | Performed by: NURSE PRACTITIONER

## 2017-03-28 PROCEDURE — 99999 PR PBB SHADOW E&M-EST. PATIENT-LVL III: CPT | Mod: PBBFAC,,, | Performed by: NURSE PRACTITIONER

## 2017-03-28 PROCEDURE — 99213 OFFICE O/P EST LOW 20 MIN: CPT | Mod: PBBFAC | Performed by: NURSE PRACTITIONER

## 2017-03-28 RX ORDER — SODIUM, POTASSIUM,MAG SULFATES 17.5-3.13G
1 SOLUTION, RECONSTITUTED, ORAL ORAL ONCE
Qty: 1 BOTTLE | Refills: 0 | Status: SHIPPED | OUTPATIENT
Start: 2017-03-28 | End: 2017-03-28

## 2017-03-28 NOTE — MR AVS SNAPSHOT
[facility]  [address]  [place] [address]  Phone: [phone]  Fax: [phone]                  Tristan Queen   3/28/2017 2:30 PM   Office Visit    Description:  Male : 1945   Provider:  Torri Vee NP   Department:  [facility]           Diagnoses this Visit        Comments    Screen for colon cancer    -  Primary            To Do List           Goals (5 Years of Data)     None      [facility] On [facility]     [facility] On Call Nurse Care Line -  Assistance  Registered nurses in the [facility] On Call Center provide clinical advisement, health education, appointment booking, and other advisory services.  Call for this free service at [phone].             Medications           Message regarding Medications     Verify the changes and/or additions to your medication regime listed below are the same as discussed with your clinician today.  If any of these changes or additions are incorrect, please notify your healthcare provider.             Verify that the below list of medications is an accurate representation of the medications you are currently taking.  If none reported, the list may be blank. If incorrect, please contact your healthcare provider. Carry this list with you in case of emergency.           Current Medications     allopurinol (ZYLOPRIM) 300 MG tablet Take 300 mg by mouth once daily.     amlodipine (NORVASC) 10 MG tablet Take 1 tablet (10 mg total) by mouth once daily.    aspirin (ECOTRIN) 81 MG EC tablet Take 81 mg by mouth daily       atorvastatin (LIPITOR) 80 MG tablet Take 1 tablet (80 mg total) by mouth once daily.    cetirizine (ZYRTEC) 10 MG tablet 10 mg daily Take by mouth daily prn    cholecalciferol, vitamin D3, 4,000 unit Cap Take 4,000 Units by mouth daily    fluticasone (FLONASE) 50 mcg/actuation nasal spray 2 sprays by Nasal route nightly       furosemide (LASIX) 20 MG tablet Take 10 mg by mouth once daily.     lisinopril  "(PRINIVIL,ZESTRIL) 40 MG tablet Take 40 mg by mouth once daily.     metoprolol succinate (TOPROL-XL) 100 MG 24 hr tablet Take 1 tablet (100 mg total) by mouth once daily.           Clinical Reference Information           Your Vitals Were     BP Pulse Height Weight BMI    118/74 58 5' 11" (1.803 m) 117 kg (257 lb 15 oz) 35.98 kg/m2      Blood Pressure          Most Recent Value    BP  118/74      Allergies as of 3/28/2017     No Known Allergies      Immunizations Administered on Date of Encounter - 3/28/2017     None      Orders Placed During Today's Visit      Normal Orders This Visit    Case request GI: COLONOSCOPY       Language Assistance Services     ATTENTION: Language assistance services are available, free of charge. Please call [phone].      ATENCIÓN: Si kizzy gottlieb, tiene a dougherty disposición servicios gratuitos de asistencia lingüística. Llame al [phone].     CHÚ Ý: N?u b?n nói Ti?ng Vi?t, có các d?ch v? h? tr? ngôn ng? mi?n phí dành cho b?n. G?i s? [phone].         [facility] complies with applicable Federal civil rights laws and does not discriminate on the basis of race, color, national origin, age, disability, or sex.        "

## 2017-03-28 NOTE — PROGRESS NOTES
[facility] Note    Reason for Visit:  The encounter diagnosis was Screen for colon cancer.    PCP:   Primary Doctor Tawanna   [address] [facility] [address] / [facility] [address]    Referring MD:  Steffanie Nguyễn Md  [address] [facility]  8th [place] [address]    HPI:  This is a 71 y.o. male here for evaluation for a screening colonoscopy. Mr. Queen is from [place], [place] and is staying at a local apartment complex d/t his wife is on the wait list for a liver and kidney transplant and was advised not to travel. Pt reports he was previously scheduled to have his recall c-scope in October or November of last year back in [place], but was unable to have it done d/t they moved here prior to getting the scope done.     Last egyudrqekag-6-6 years ago- 2 polyps removed. 1st colonoscopy 3-4 years prior to the last-WNL  Family hx of colon cancer- brother with colon cancer dx at 60 years old. Living. Is s/p resection  Family hx of colon polyps-brother (cancer) and mother (benign)  Change in bowel habits- denies   Pencil thin stools- denies  Bowel habits - normal. 1-2 bristol type 4 BM/day  GI bleeding - denies hematochezia, hematemesis, melena, BRBPR, black/tarry stools, and coffee ground emesis    Abdominal pain - no  Reflux - no  Dysphagia/odynophagia - no   NSAID usage -81 mg ASA daily    ROS:  Constitutional: No fevers, no chills, No unintentional weight loss, no fatigue,   ENT: No allergies  CV: No chest pain, no palpitations, no perif. edema, no sob on exertion  Pulm: No cough, No shortness of breath, no wheezes, no sputum  Ophtho: No vision changes  GI: see HPI; also no nausea, no vomiting, no change in appetite  Derm: No rash  Heme: No lymphadenopathy, No bruising  MSK: No arthritis, no muscle pain, no muscle weakness  : No dysuria, No hematuria  Endo: No hot or cold intolerance  Neuro: No syncope, No seizure,       Medical History:  has a past medical history of Coronary  "artery disease and Hypertension.    Surgical History:  has a past surgical history that includes Cardiac catheterization (2007); biscep surgery; and right hip replac.    Family History: family history includes Colon cancer (age of onset: 60) in his brother; Colon polyps in his brother and mother; Heart attack in his brother, father, and mother; Heart disease in his mother; Hypertension in his brother, father, and mother. There is no history of Stomach cancer, Esophageal cancer, Ulcerative colitis, Crohn's disease, Irritable bowel syndrome, or Celiac disease..     Social History:  reports that he has never smoked. He does not have any smokeless tobacco history on file. He reports that he does not drink alcohol or use illicit drugs.    Review of patient's allergies indicates:  No Known Allergies    Current Outpatient Prescriptions   Medication Sig    allopurinol (ZYLOPRIM) 300 MG tablet Take 300 mg by mouth once daily.     amlodipine (NORVASC) 10 MG tablet Take 1 tablet (10 mg total) by mouth once daily.    aspirin (ECOTRIN) 81 MG EC tablet Take 81 mg by mouth daily       atorvastatin (LIPITOR) 80 MG tablet Take 1 tablet (80 mg total) by mouth once daily.    cetirizine (ZYRTEC) 10 MG tablet 10 mg daily Take by mouth daily prn    cholecalciferol, vitamin D3, 4,000 unit Cap Take 4,000 Units by mouth daily    fluticasone (FLONASE) 50 mcg/actuation nasal spray 2 sprays by Nasal route nightly       furosemide (LASIX) 20 MG tablet Take 10 mg by mouth once daily.     lisinopril (PRINIVIL,ZESTRIL) 40 MG tablet Take 40 mg by mouth once daily.     metoprolol succinate (TOPROL-XL) 100 MG 24 hr tablet Take 1 tablet (100 mg total) by mouth once daily.     No current facility-administered medications for this visit.        Objective Findings:    Vital Signs:  /74  Pulse (!) 58  Ht 5' 11" (1.803 m)  Wt 117 kg (257 lb 15 oz)  BMI 35.98 kg/m2  Body mass index is 35.98 kg/(m^2).    Physical Exam:  General " Appearance: Well appearing in no acute distress  Head:   Normocephalic, without obvious abnormality  Eyes:    No scleral icterus, EOMI  ENT: Neck supple, Lips, mucosa, and tongue normal; teeth and gums normal  Lungs: CTA bilaterally in anterior and posterior fields, no wheezes, no crackles.  Heart:  Regular rate and rhythm, S1, S2 normal, no murmurs heard  Abdomen: Soft, non tender, non distended with positive bowel sounds in all four quadrants. No hepatosplenomegaly, ascites, or mass  Extremities: 2+ radial pulses, no clubbing, cyanosis or edema  Skin: No rash to exposed areas  Neurologic: A&Ox4      Labs:  Lab Results   Component Value Date    WBC 7.26 03/23/2017    HGB 16.8 03/23/2017    HCT 46.4 03/23/2017     03/23/2017    CHOL 160 03/23/2017    TRIG 238 (H) 03/23/2017    HDL 37 (L) 03/23/2017     03/23/2017    K 4.7 03/23/2017     03/23/2017    CREATININE 1.2 03/23/2017    BUN 22 03/23/2017    CO2 26 03/23/2017       Endoscopy:    EGD-none  Per pt 4-5 years ago in [place] Colonoscopy-2 polyps removed.  Per pt, 7-8 years ago in [place] Colonoscopy-WNL  Assessment:  1. Screen for colon cancer           Recommendations:  1. Screen for colon cancer- hx polyps, and family hx of colon cancer. High risk colonoscopy ordered.             Order summary:  Orders Placed This Encounter    Case request GI: COLONOSCOPY         Thank you so much for allowing me to participate in the care of Tristan Queen    VAHE Hua, FNP-C

## 2017-03-28 NOTE — LETTER
March 28, 2017      Steffanie Nguyễn MD  [address] [facility]  [address]th [address]  [place] [address]           [facility] Gastroenter[facility]  [address]  [place] [address]  Phone: [phone]  Fax: [phone]          Patient: Tristan Queen   MR Number: [id number]   YOB: 1945   Date of Visit: 3/28/2017       Dear Dr. Steffanie Nguyễn:    Thank you for referring Tristan Queen to me for evaluation. Attached you will find relevant portions of my assessment and plan of care.    If you have questions, please do not hesitate to call me. I look forward to following Tristan Queen along with you.    Sincerely,    Torri Vee, EVIE    Enclosure  CC:  No Recipients    If you would like to receive this communication electronically, please contact [email or web address] or [phone] to request more information on [email or web address] Link access.    For providers and/or their staff who would like to refer a patient to [facility], please contact us through our one-stop-shop provider referral line, [facility], at [phone].    If you feel you have received this communication in error or would no longer like to receive these types of communications, please e-mail [email or web address]

## 2017-06-19 ENCOUNTER — TELEPHONE (OUTPATIENT)
Dept: ENDOSCOPY | Facility: HOSPITAL | Age: 72
End: 2017-06-19

## 2017-07-27 ENCOUNTER — PATIENT MESSAGE (OUTPATIENT)
Dept: RESEARCH | Facility: HOSPITAL | Age: 72
End: 2017-07-27

## 2017-09-25 ENCOUNTER — OFFICE VISIT (OUTPATIENT)
Dept: URGENT CARE | Facility: CLINIC | Age: 72
End: 2017-09-25
Payer: MEDICARE

## 2017-09-25 VITALS
SYSTOLIC BLOOD PRESSURE: 113 MMHG | DIASTOLIC BLOOD PRESSURE: 72 MMHG | HEIGHT: 71 IN | WEIGHT: 245 LBS | HEART RATE: 73 BPM | RESPIRATION RATE: 16 BRPM | TEMPERATURE: 99 F | OXYGEN SATURATION: 97 % | BODY MASS INDEX: 34.3 KG/M2

## 2017-09-25 DIAGNOSIS — R05.9 COUGH: Primary | ICD-10-CM

## 2017-09-25 DIAGNOSIS — J40 BRONCHITIS: ICD-10-CM

## 2017-09-25 PROCEDURE — 3078F DIAST BP <80 MM HG: CPT | Mod: S$GLB,,, | Performed by: PHYSICIAN ASSISTANT

## 2017-09-25 PROCEDURE — 99203 OFFICE O/P NEW LOW 30 MIN: CPT | Mod: S$GLB,,, | Performed by: PHYSICIAN ASSISTANT

## 2017-09-25 PROCEDURE — 1159F MED LIST DOCD IN RCRD: CPT | Mod: S$GLB,,, | Performed by: PHYSICIAN ASSISTANT

## 2017-09-25 PROCEDURE — 3074F SYST BP LT 130 MM HG: CPT | Mod: S$GLB,,, | Performed by: PHYSICIAN ASSISTANT

## 2017-09-25 RX ORDER — METHYLPREDNISOLONE 4 MG/1
TABLET ORAL
Qty: 1 PACKAGE | Refills: 0 | Status: SHIPPED | OUTPATIENT
Start: 2017-09-25 | End: 2018-06-05

## 2017-09-25 RX ORDER — AZITHROMYCIN 250 MG/1
250 TABLET, FILM COATED ORAL DAILY
Qty: 6 TABLET | Refills: 0 | Status: SHIPPED | OUTPATIENT
Start: 2017-09-25 | End: 2017-09-30

## 2017-09-25 NOTE — PROGRESS NOTES
"Subjective:       Patient ID: Tristan Queen is a 72 y.o. male.    Vitals:  height is 5' 11" (1.803 m) and weight is 111.1 kg (245 lb). His tympanic temperature is 99.2 °F (37.3 °C). His blood pressure is 113/72 and his pulse is 73. His respiration is 16 and oxygen saturation is 97%.     Chief Complaint: Cough and Sinus Problem    This is a 72 y.o. male with Past Medical History:  No date: Coronary artery disease  No date: Enlarged prostate  No date: Hypertension   who presents today with a chief complaint of head and chest congestion with fever. 100.4F last night.       Cough   This is a new problem. The current episode started in the past 7 days. The problem has been gradually worsening. The problem occurs hourly. The cough is productive of purulent sputum. Associated symptoms include ear congestion, a fever, headaches, nasal congestion, postnasal drip, rhinorrhea and a sore throat. Pertinent negatives include no chest pain, chills, ear pain, eye redness, myalgias, rash, shortness of breath or wheezing. The symptoms are aggravated by lying down. He has tried OTC cough suppressant for the symptoms. The treatment provided moderate relief.   Sinus Problem   This is a new problem. The current episode started in the past 7 days. The problem has been gradually worsening since onset. The maximum temperature recorded prior to his arrival was 100.4 - 100.9 F. The fever has been present for less than 1 day. His pain is at a severity of 2/10. The pain is mild. Associated symptoms include congestion, coughing, headaches, a hoarse voice, sinus pressure and a sore throat. Pertinent negatives include no chills, ear pain or shortness of breath. Past treatments include acetaminophen and spray decongestants. The treatment provided mild relief.     Review of Systems   Constitution: Positive for fever. Negative for chills and malaise/fatigue.   HENT: Positive for congestion, hoarse voice, postnasal drip, rhinorrhea, sinus pressure and " sore throat. Negative for ear pain.    Eyes: Negative for discharge and redness.   Cardiovascular: Negative for chest pain, dyspnea on exertion and leg swelling.   Respiratory: Positive for cough. Negative for shortness of breath, sputum production and wheezing.    Skin: Negative for rash.   Musculoskeletal: Negative for myalgias.   Gastrointestinal: Negative for abdominal pain and nausea.   Neurological: Positive for headaches.       Objective:      Physical Exam   Constitutional: He is oriented to person, place, and time. He appears well-developed and well-nourished. No distress.   HENT:   Head: Normocephalic and atraumatic.   Right Ear: Hearing, tympanic membrane, external ear and ear canal normal.   Left Ear: Hearing, tympanic membrane, external ear and ear canal normal.   Nose: Nose normal.   Mouth/Throat: Oropharynx is clear and moist.   Eyes: Conjunctivae are normal.   Neck: Normal range of motion. Neck supple.   Cardiovascular: Normal rate and regular rhythm.  Exam reveals no gallop and no friction rub.    No murmur heard.  Pulmonary/Chest: Effort normal and breath sounds normal. He has no wheezes. He has no rales.   Musculoskeletal: Normal range of motion.   Neurological: He is alert and oriented to person, place, and time.   Skin: Skin is warm and dry. No rash noted. No erythema.   Psychiatric: He has a normal mood and affect. His behavior is normal. Judgment and thought content normal.   Nursing note and vitals reviewed.      3:29 PM - Chest xray shows no infiltrate.    Assessment:       1. Cough    2. Bronchitis        Plan:         Cough  -     X-Ray Chest PA And Lateral; Future; Expected date: 09/25/2017    Bronchitis  -     methylPREDNISolone (MEDROL DOSEPACK) 4 mg tablet; use as directed  Dispense: 1 Package; Refill: 0  -     azithromycin (Z-ANGELICA) 250 MG tablet; Take 1 tablet (250 mg total) by mouth once daily. Take 2 tablets on day 1.  Dispense: 6 tablet; Refill: 0      Tristan was seen today for cough  and sinus problem.    Diagnoses and all orders for this visit:    Cough  -     X-Ray Chest PA And Lateral; Future    Bronchitis  -     methylPREDNISolone (MEDROL DOSEPACK) 4 mg tablet; use as directed  -     azithromycin (Z-ANGELICA) 250 MG tablet; Take 1 tablet (250 mg total) by mouth once daily. Take 2 tablets on day 1.      Patient Instructions   - Rest.    - Drink plenty of fluids.    - Tylenol or Ibuprofen as directed as needed for fever/pain.    - Take over-the-counter claritin, zyrtec, allegra, or xyzal as directed.  - Use over the counter Flonase as directed for sinus congestion and postnasal drip.   - Follow up with your PCP or specialty clinic as directed in the next 1-2 weeks if not improved or as needed.  You can call [phone] to schedule an appointment with the appropriate provider.    - Go to the ED if your symptoms worsen.    Bronchitis, Antibiotic Treatment (Adult)    Bronchitis is an infection of the air passages (bronchial tubes) in your lungs. It often occurs when you have a cold. This illness is contagious during the first few days and is spread through the air by coughing and sneezing, or by direct contact (touching the sick person and then touching your own eyes, nose, or mouth).  Symptoms of bronchitis include cough with mucus (phlegm) and low-grade fever. Bronchitis usually lasts 7 to 14 days. Mild cases can be treated with simple home remedies. More severe infection is treated with an antibiotic.  Home care  Follow these guidelines when caring for yourself at home:  · If your symptoms are severe, rest at home for the first 2 to 3 days. When you go back to your usual activities, don't let yourself get too tired.  · Do not smoke. Also avoid being exposed to secondhand smoke.  · You may use over-the-counter medicines to control fever or pain, unless another medicine was prescribed. (Note: If you have chronic liver or kidney disease or have ever had a stomach ulcer or gastrointestinal bleeding,  talk with your healthcare provider before using these medicines. Also talk to your provider if you are taking medicine to prevent blood clots.) Aspirin should never be given to anyone younger than 18 years of age who is ill with a viral infection or fever. It may cause severe liver or brain damage.  · Your appetite may be poor, so a light diet is fine. Avoid dehydration by drinking 6 to 8 glasses of fluids per day (such as water, soft drinks, sports drinks, juices, tea, or soup). Extra fluids will help loosen secretions in the nose and lungs.  · Over-the-counter cough, cold, and sore-throat medicines will not shorten the length of the illness, but they may be helpful to reduce symptoms. (Note: Do not use decongestants if you have high blood pressure.)  · Finish all antibiotic medicine. Do this even if you are feeling better after only a few days.  Follow-up care  Follow up with your healthcare provider, or as advised. If you had an X-ray or ECG (electrocardiogram), a specialist will review it. You will be notified of any new findings that may affect your care.  Note: If you are age 65 or older, or if you have a chronic lung disease or condition that affects your immune system, or you smoke, talk to your healthcare provider about having pneumococcal vaccinations and a yearly influenza vaccination (flu shot).  When to seek medical advice  Call your healthcare provider right away if any of these occur:  · Fever of 100.4°F (38°C) or higher  · Coughing up increased amounts of colored sputum  · Weakness, drowsiness, headache, facial pain, ear pain, or a stiff neck  Call 911, or get immediate medical care  Contact emergency services right away if any of these occur.  · Coughing up blood  · Worsening weakness, drowsiness, headache, or stiff neck  · Trouble breathing, wheezing, or pain with breathing  Date Last Reviewed: 9/13/2015 © 2000-2017 [employer]. [address] [place] [address], [place] [address]. All rights  reserved. This information is not intended as a substitute for professional medical care. Always follow your healthcare professional's instructions.

## 2017-09-25 NOTE — PATIENT INSTRUCTIONS
- Rest.    - Drink plenty of fluids.    - Tylenol or Ibuprofen as directed as needed for fever/pain.    - Take over-the-counter claritin, zyrtec, allegra, or xyzal as directed.  - Use over the counter Flonase as directed for sinus congestion and postnasal drip.   - Follow up with your PCP or specialty clinic as directed in the next 1-2 weeks if not improved or as needed.  You can call [phone] to schedule an appointment with the appropriate provider.    - Go to the ED if your symptoms worsen.    Bronchitis, Antibiotic Treatment (Adult)    Bronchitis is an infection of the air passages (bronchial tubes) in your lungs. It often occurs when you have a cold. This illness is contagious during the first few days and is spread through the air by coughing and sneezing, or by direct contact (touching the sick person and then touching your own eyes, nose, or mouth).  Symptoms of bronchitis include cough with mucus (phlegm) and low-grade fever. Bronchitis usually lasts 7 to 14 days. Mild cases can be treated with simple home remedies. More severe infection is treated with an antibiotic.  Home care  Follow these guidelines when caring for yourself at home:  · If your symptoms are severe, rest at home for the first 2 to 3 days. When you go back to your usual activities, don't let yourself get too tired.  · Do not smoke. Also avoid being exposed to secondhand smoke.  · You may use over-the-counter medicines to control fever or pain, unless another medicine was prescribed. (Note: If you have chronic liver or kidney disease or have ever had a stomach ulcer or gastrointestinal bleeding, talk with your healthcare provider before using these medicines. Also talk to your provider if you are taking medicine to prevent blood clots.) Aspirin should never be given to anyone younger than 18 years of age who is ill with a viral infection or fever. It may cause severe liver or brain damage.  · Your appetite may be poor, so a light diet  is fine. Avoid dehydration by drinking 6 to 8 glasses of fluids per day (such as water, soft drinks, sports drinks, juices, tea, or soup). Extra fluids will help loosen secretions in the nose and lungs.  · Over-the-counter cough, cold, and sore-throat medicines will not shorten the length of the illness, but they may be helpful to reduce symptoms. (Note: Do not use decongestants if you have high blood pressure.)  · Finish all antibiotic medicine. Do this even if you are feeling better after only a few days.  Follow-up care  Follow up with your healthcare provider, or as advised. If you had an X-ray or ECG (electrocardiogram), a specialist will review it. You will be notified of any new findings that may affect your care.  Note: If you are age 65 or older, or if you have a chronic lung disease or condition that affects your immune system, or you smoke, talk to your healthcare provider about having pneumococcal vaccinations and a yearly influenza vaccination (flu shot).  When to seek medical advice  Call your healthcare provider right away if any of these occur:  · Fever of 100.4°F (38°C) or higher  · Coughing up increased amounts of colored sputum  · Weakness, drowsiness, headache, facial pain, ear pain, or a stiff neck  Call 911, or get immediate medical care  Contact emergency services right away if any of these occur.  · Coughing up blood  · Worsening weakness, drowsiness, headache, or stiff neck  · Trouble breathing, wheezing, or pain with breathing  Date Last Reviewed: 9/13/2015  © 2671-4219 [employer]. [address] [place] [address], [place] [address]. All rights reserved. This information is not intended as a substitute for professional medical care. Always follow your healthcare professional's instructions.

## 2017-09-27 ENCOUNTER — TELEPHONE (OUTPATIENT)
Dept: URGENT CARE | Facility: CLINIC | Age: 72
End: 2017-09-27

## 2018-02-16 DIAGNOSIS — I10 ESSENTIAL HYPERTENSION: ICD-10-CM

## 2018-02-23 RX ORDER — METOPROLOL SUCCINATE 100 MG/1
TABLET, EXTENDED RELEASE ORAL
Qty: 90 TABLET | Refills: 3 | Status: SHIPPED | OUTPATIENT
Start: 2018-02-23

## 2018-03-02 ENCOUNTER — TELEPHONE (OUTPATIENT)
Dept: ENDOSCOPY | Facility: HOSPITAL | Age: 73
End: 2018-03-02

## 2018-03-16 ENCOUNTER — TELEPHONE (OUTPATIENT)
Dept: GASTROENTEROLOGY | Facility: CLINIC | Age: 73
End: 2018-03-16

## 2018-03-16 NOTE — TELEPHONE ENCOUNTER
"Tristan Queen - Case request GI: COLONOSCOPY << Less Detail',event)" href="javascript:;">More Detail >>      Cancellation of Order # [id number]   Aaliyah Rubio RN   Sent: Thu March 15, 2018  3:52 PM   To: Torri Vee NP                  Message     Order number [id number] for the procedure CASE REQUEST GI [GI5]    has been canceled by Aaliyah Rubio RN [[id number]]. This procedure    was ordered by Torri Vee NP [[id number]] on Mar 28, 2017 for    the patient Tristan Queen [[id number]]. The reason for cancellation    was "None".      Case request GI: COLONOSCOPY [GI5] (Order [id number])   Case Request   CANCELED   Date and Time: 3/28/2017  3:14 PM Department: [facility] Gastroenterology Rel By/Authorizing: Torri Vee NP   This Order Has Been Canceled     Order Status Reason By On   Canceled None Aaliyah Rubio RN 3/15/18 1552   The associated case was canceled: Order                "

## 2018-06-05 ENCOUNTER — TELEPHONE (OUTPATIENT)
Dept: ENDOSCOPY | Facility: HOSPITAL | Age: 73
End: 2018-06-05

## 2018-06-05 ENCOUNTER — OFFICE VISIT (OUTPATIENT)
Dept: OPTOMETRY | Facility: CLINIC | Age: 73
End: 2018-06-05
Payer: MEDICARE

## 2018-06-05 DIAGNOSIS — H52.4 MYOPIA WITH ASTIGMATISM AND PRESBYOPIA, BILATERAL: ICD-10-CM

## 2018-06-05 DIAGNOSIS — H35.3112 INTERMEDIATE STAGE NONEXUDATIVE AGE-RELATED MACULAR DEGENERATION OF RIGHT EYE: ICD-10-CM

## 2018-06-05 DIAGNOSIS — H25.13 NUCLEAR SCLEROSIS OF BOTH EYES: Primary | ICD-10-CM

## 2018-06-05 DIAGNOSIS — H04.123 DRY EYE SYNDROME OF BOTH EYES: ICD-10-CM

## 2018-06-05 DIAGNOSIS — H52.13 MYOPIA WITH ASTIGMATISM AND PRESBYOPIA, BILATERAL: ICD-10-CM

## 2018-06-05 DIAGNOSIS — H52.203 MYOPIA WITH ASTIGMATISM AND PRESBYOPIA, BILATERAL: ICD-10-CM

## 2018-06-05 PROCEDURE — 92004 COMPRE OPH EXAM NEW PT 1/>: CPT | Mod: S$PBB,,, | Performed by: OPTOMETRIST

## 2018-06-05 PROCEDURE — 92134 CPTRZ OPH DX IMG PST SGM RTA: CPT | Mod: PBBFAC | Performed by: OPTOMETRIST

## 2018-06-05 PROCEDURE — 99212 OFFICE O/P EST SF 10 MIN: CPT | Mod: PBBFAC,25 | Performed by: OPTOMETRIST

## 2018-06-05 PROCEDURE — 99999 PR PBB SHADOW E&M-EST. PATIENT-LVL II: CPT | Mod: PBBFAC,,, | Performed by: OPTOMETRIST

## 2018-06-05 RX ORDER — SIMVASTATIN 80 MG/1
80 TABLET, FILM COATED ORAL
COMMUNITY
End: 2018-06-05

## 2018-06-05 RX ORDER — ATORVASTATIN CALCIUM 80 MG/1
TABLET, FILM COATED ORAL
COMMUNITY
Start: 2018-05-03

## 2018-06-05 RX ORDER — AMLODIPINE BESYLATE 10 MG/1
TABLET ORAL
COMMUNITY
Start: 2018-04-09

## 2018-06-05 RX ORDER — TAMSULOSIN HYDROCHLORIDE 0.4 MG/1
CAPSULE ORAL DAILY
COMMUNITY
Start: 2018-05-03

## 2018-06-05 RX ORDER — FLUTICASONE PROPIONATE 44 UG/1
2 AEROSOL, METERED RESPIRATORY (INHALATION)
COMMUNITY
End: 2018-06-05 | Stop reason: SDUPTHER

## 2018-06-05 NOTE — PROGRESS NOTES
HPI     Last eye exam was approximately 2-3 years ago.  Current glasses broke and had them temporarily fixed downstairs in the   [facility]ical. Has noticed a slight decrease in overall vision with current   glasses. Didn't update glasses after last exam. Was told he had cataracts   but they weren't ready to be removed. OU burn and water when reading at   night.   Patient denies diplopia, headaches, flashes/floaters, and pain.      Last edited by Darline Manzanares on 6/5/2018  9:38 AM. (History)            Assessment /Plan     For exam results, see Encounter Report.    Nuclear sclerosis of both eyes    Intermediate stage nonexudative age-related macular degeneration of right eye    Dry eye syndrome of both eyes    Myopia with astigmatism and presbyopia, bilateral            1.  Educated on cataracts and affects on vision.  Not causing decrease in vision OD. Monitor.  2.  Appears to have AMD OD.  Patient reports history of retinal swelling but unsure which eye?  Refer to retina for further evlauation.  3.  Educated pt burning caused by dryness.  Artificial tears as needed.  4.  Bifocal rx finalized.  Patient will wait until after retina appointment to fill.

## 2018-06-08 ENCOUNTER — INITIAL CONSULT (OUTPATIENT)
Dept: OPHTHALMOLOGY | Facility: CLINIC | Age: 73
End: 2018-06-08
Payer: MEDICARE

## 2018-06-08 VITALS — SYSTOLIC BLOOD PRESSURE: 109 MMHG | HEART RATE: 61 BPM | DIASTOLIC BLOOD PRESSURE: 67 MMHG

## 2018-06-08 DIAGNOSIS — H25.13 NUCLEAR SCLEROSIS OF BOTH EYES: ICD-10-CM

## 2018-06-08 DIAGNOSIS — H35.3110 NONEXUDATIVE AGE-RELATED MACULAR DEGENERATION OF RIGHT EYE, UNSPECIFIED STAGE: Primary | ICD-10-CM

## 2018-06-08 DIAGNOSIS — H31.011 MACULAR SCAR OF RIGHT EYE: ICD-10-CM

## 2018-06-08 PROCEDURE — 99999 PR PBB SHADOW E&M-EST. PATIENT-LVL III: CPT | Mod: PBBFAC,,, | Performed by: OPHTHALMOLOGY

## 2018-06-08 PROCEDURE — 92235 FLUORESCEIN ANGRPH MLTIFRAME: CPT | Mod: PBBFAC | Performed by: OPHTHALMOLOGY

## 2018-06-08 PROCEDURE — 99213 OFFICE O/P EST LOW 20 MIN: CPT | Mod: PBBFAC | Performed by: OPHTHALMOLOGY

## 2018-06-08 PROCEDURE — 92014 COMPRE OPH EXAM EST PT 1/>: CPT | Mod: S$PBB,,, | Performed by: OPHTHALMOLOGY

## 2018-06-08 PROCEDURE — 92250 FUNDUS PHOTOGRAPHY W/I&R: CPT | Mod: PBBFAC | Performed by: OPHTHALMOLOGY

## 2018-06-08 NOTE — LETTER
June 8, 2018      More Franco, OD  [address]  [facility] [address]           [facility]  [address]  [facility] [address]  Phone: [phone]  Fax: [phone]          Patient: Tristan Queen   MR Number: [id number]   YOB: 1945   Date of Visit: 6/8/2018       Dear Dr. More Franco:    Thank you for referring Tristan Queen to me for evaluation. Attached you will find relevant portions of my assessment and plan of care.    If you have questions, please do not hesitate to call me. I look forward to following Tristan Queen along with you.    Sincerely,    Arsenio Cowan MD    Enclosure  CC:  No Recipients    If you would like to receive this communication electronically, please contact [email or web address] or [phone] to request more information on [employer] Link access.    For providers and/or their staff who would like to refer a patient to [facility], please contact us through our one-stop-shop provider referral line, [facility] [place], operated by [facility], at [phone].    If you feel you have received this communication in error or would no longer like to receive these types of communications, please e-mail [email or web address]

## 2018-06-08 NOTE — PATIENT INSTRUCTIONS
Fluorescein Angiography     A fluorescein angiogram of the retina   Fluorescein angiography is an eye test. It is done to look at the back of your eye, including:  · The blood vessels in your eye  · The layer of tissue at the back of your eye (the retina)  · The center of your retina (the macula)  · The optic nerve  This test can diagnose diseases found in these areas. It can also diagnose other conditions that affect these areas. To do this test, a dye called fluorescein is shot (injected) into your arm. The dye goes into your bloodstream and up into the blood vessels in your eyes. A special camera is then used to take images (angiograms) of your eyes.  Getting ready for your test  Tell your healthcare provider if you:  · Are pregnant or think you may be pregnant  · Are breastfeeding  · Have a history of severe allergic reactions, including to X-ray dye or other medicines  · Have kidney problems  Tell your provider about any medicines you are taking. You may need to stop taking all or some of these before the test. This includes:  · All prescription medicines  · Over-the-counter medicines such as aspirin or ibuprofen  · Street drugs  · Herbs, vitamins, and other supplements  You should arrange for an adult family member or friend to drive you home after your test. Your vision will be blurry for up to 12 hours.  Follow any other instructions from your healthcare provider.  During your test  · You are given eye drops to enlarge (dilate) your pupils.  · You then sit in front of a special camera. You place your chin on the chin rest and look into the camera.  · Images are taken of your eyes, one eye at a time.  · Fluorescein dye is then injected into your arm. The lights in the room are turned off. You may have mild nausea. You may have a warm feeling in your arm or upper body. Tell your provider if your skin feels itchy or if you are having trouble breathing. If so, you could be having an allergic reaction to the  dye.  · More pictures of your eyes are taken over 15 to 30 minutes. The camera shines a bright light into your eyes. Try to keep your head still and your eyes open.  · When enough images have been taken, the test is over.  After your test  Your vision will be blurry for up to 4 to 12 hours. This is because of your dilated pupils. Your eye will be more sensitive to light for up to 12 hours. You may want to wear sunglasses during this time. Do not drive if your vision is very blurry. You may also find it uncomfortable to read. Your skin may look yellow for a few hours. This is from the dye. Your urine will be bright yellow or orange for 24 to 48 hours after the test.     Risks and possible complications  All procedures have some risks. Possible risks of fluorescein angiography include:  · Upset stomach (nausea) and vomiting  · Leaking dye around the injection site that causes pain and swelling  · Metallic taste in your mouth  · Infection at injection site  · Allergic reaction to the dye  · Dry mouth or too much saliva  · Faster heart rate  · Sweating  · Lower back pain   Date Last Reviewed: 5/30/2015  © 7448-6783 [employer]. [address] [place] [address], [place] [address]. All rights reserved. This information is not intended as a substitute for professional medical care. Always follow your healthcare professional's instructions.

## 2018-06-08 NOTE — PROGRESS NOTES
"HPI     73 Y/O M here today per referral by Dr. RENAN Franco, OD for Macular Deg.   Eval.     Pt states that in the last several months, notes that vision OD is blurry   centrally.  Not really progressive since he noted it.  Va OS normal.  No   f/f/pain OU.    About 10-15 yrs ago was told he had fluid in one of his eyes.  He   remembers the word "retinopathy" but not sure of dx.  Thinks he had no   treatment.  Has not had eye exam for several years.        Last edited by Arsenio Cowan MD on 6/8/2018  4:13 PM. (History)        OCT with SR density without fluid OD    Fundus photos: good quality OU  OD: clear media, ONH s/p, vessels nl, mac RPE atrophy with pigment, IT hypopigmentation, sl PPA  OS: clear media, ONH s/p, vessels nl, mac good pigment, temp drusen, sl PPA    FA: fair quality OU  OD:  Symmetric transit, central and temp mac window defect with staining, staining PPA, SN staining vs vasc remodeling.  Photo diff to interpret.  No leakage  OS: staining PPA, no other hyper or hypofl    Assessment /Plan     For exam results, see Encounter Report.    Nonexudative age-related macular degeneration of right eye, unspecified stage  -     Cancel: OCT- Retina  -     Flourescein Angiography - OU - Both Eyes  -     Color Fundus Photography - OU - Both Eyes    Macular scar of right eye    NS with good Va OU.  Observe         Possible old CSCR based on multifocal hyperfl areas on FA   SN area of FA OD seems could be c/w vascular remodeling    Pt will try to get old records.  Would start AREDS 2 but likely etiology other than AMD    RTC 3 months sooner PRN.  Home Amsler at least weekly  "

## 2018-06-13 ENCOUNTER — OFFICE VISIT (OUTPATIENT)
Dept: CARDIOLOGY | Facility: CLINIC | Age: 73
End: 2018-06-13
Payer: MEDICARE

## 2018-06-13 ENCOUNTER — TELEPHONE (OUTPATIENT)
Dept: CARDIOLOGY | Facility: CLINIC | Age: 73
End: 2018-06-13

## 2018-06-13 ENCOUNTER — HOSPITAL ENCOUNTER (OUTPATIENT)
Dept: CARDIOLOGY | Facility: CLINIC | Age: 73
Discharge: HOME | End: 2018-06-13
Payer: MEDICARE

## 2018-06-13 VITALS
BODY MASS INDEX: 36.85 KG/M2 | WEIGHT: 263.25 LBS | SYSTOLIC BLOOD PRESSURE: 125 MMHG | DIASTOLIC BLOOD PRESSURE: 65 MMHG | HEIGHT: 71 IN | HEART RATE: 58 BPM

## 2018-06-13 DIAGNOSIS — I10 HYPERTENSION, UNSPECIFIED TYPE: ICD-10-CM

## 2018-06-13 DIAGNOSIS — E78.00 HYPERCHOLESTEREMIA: ICD-10-CM

## 2018-06-13 DIAGNOSIS — I10 ESSENTIAL HYPERTENSION: Primary | ICD-10-CM

## 2018-06-13 DIAGNOSIS — I10 HYPERTENSION, UNSPECIFIED TYPE: Primary | ICD-10-CM

## 2018-06-13 DIAGNOSIS — Z95.820 S/P ANGIOPLASTY WITH STENT: ICD-10-CM

## 2018-06-13 DIAGNOSIS — E88.810 METABOLIC SYNDROME: ICD-10-CM

## 2018-06-13 DIAGNOSIS — Z13.1 DIABETES MELLITUS SCREENING: ICD-10-CM

## 2018-06-13 DIAGNOSIS — E74.89 OTHER SPECIFIED DISORDERS OF CARBOHYDRATE METABOLISM: ICD-10-CM

## 2018-06-13 DIAGNOSIS — Z95.5 PRESENCE OF BARE METAL STENT IN RIGHT CORONARY ARTERY: ICD-10-CM

## 2018-06-13 PROCEDURE — 99213 OFFICE O/P EST LOW 20 MIN: CPT | Mod: PBBFAC | Performed by: INTERNAL MEDICINE

## 2018-06-13 PROCEDURE — 93010 ELECTROCARDIOGRAM REPORT: CPT | Mod: S$PBB,,, | Performed by: INTERNAL MEDICINE

## 2018-06-13 PROCEDURE — 99999 PR PBB SHADOW E&M-EST. PATIENT-LVL III: CPT | Mod: PBBFAC,,, | Performed by: INTERNAL MEDICINE

## 2018-06-13 PROCEDURE — 93005 ELECTROCARDIOGRAM TRACING: CPT | Mod: PBBFAC | Performed by: INTERNAL MEDICINE

## 2018-06-13 PROCEDURE — 99215 OFFICE O/P EST HI 40 MIN: CPT | Mod: S$PBB,,, | Performed by: INTERNAL MEDICINE

## 2018-06-13 NOTE — PROGRESS NOTES
Subjective:   Patient ID:  Tristan Queen is a 72 y.o. male who presents for follow-up of Hypertension (Annual exam )  Tristan Queen is a 71 y.o. male who presents for follow-up of Hypertension (4 week f/u )  A 70-year-old male with history of PCI in . I stented his [id number] right coronary artery with 3.5 x 12 mm and 3.0 x 8 mm non-drug-eluting stents. At that time, left anterior descending artery had mild plaquing. Circumflex system supplied only a small obtuse marginal branch, and thus, he was free of irregularities. Last perfusion imaging  that was negative for ischemia.   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 Previously retired from [employer]. He has been under increased stress over the past 1- 2 years, as his wife has cirrhosis, but is not yet on the liver transplant list. She is followed by [facility], but it has been determined if she subsequently needs a liver transplant, it will be completed at [facility].     Patient at [facility] due to wife to receive liver kidney transplant.   Patient c/o chest tightness and feels like her heart is beating hard- especially when he does something exertional- like pushing her wife's wheel chair around. It has happened twice with exertion and other times it has happened at rest and sometimes seem to be worse when lying down, goes away on its own last 20-30 min. NTG has helped him. This has been going on for 2 months.   Patients BPs has gone since he has come here. It has increased. I reviewed all the BP on his phone 170-180s systolic.   EKG; SR with q waves inferior leads, no acute ST T changes.   Father, mother and brother all have had MI. (brother  of MI at age 52)  Non smoker.     PET stress     1. There is no evidence of a discrete hemodynamically significant coronary stenosis.   2. Although no clinically significant stress defect is seen, there is resting flow heterogeneity that improves after Dipyridamole in addition to reduced  "coronary flow reserve. These results suggest mild endothelial dysfunction due to high blood pressure   and mild, diffuse, non-obstructive coronary atherosclerosis without clinically significant, localized perfusion defects.   3. Resting wall motion is physiologic. Stress wall motion is physiologic.   4. LV function is normal at rest and stress.  (normal is >= 51%)  5. The ventricular volumes are normal at rest and stress.   6. The extracardiac distribution of radioactivity is normal.   7. There was no previous study available to compare.    HPI:   Walks on the [place].  No chest pain, Orthopnea, PND of heart failure symptoms.   Denies palpitations or fluttering in the chest      There is no problem list on file for this patient.    /65 (BP Location: Left arm, Patient Position: Sitting, BP Method: Large (Automatic))   Pulse (!) 58   Ht 5' 11" (1.803 m)   Wt 119.4 kg (263 lb 3.7 oz)   BMI 36.71 kg/m²   Body mass index is 36.71 kg/m².  CrCl cannot be calculated (Patient's most recent lab result is older than the maximum 7 days allowed.).    Lab Results   Component Value Date     03/23/2017    K 4.7 03/23/2017     03/23/2017    CO2 26 03/23/2017    BUN 22 03/23/2017    CREATININE 1.2 03/23/2017     (H) 03/23/2017    WBC 7.26 03/23/2017    HGB 16.8 03/23/2017    HCT 46.4 03/23/2017    MCV 90 03/23/2017     03/23/2017    CHOL 160 03/23/2017    HDL 37 (L) 03/23/2017    LDLCALC 75.4 03/23/2017    TRIG 238 (H) 03/23/2017       Current Outpatient Prescriptions   Medication Sig    allopurinol (ZYLOPRIM) 300 MG tablet Take 300 mg by mouth once daily.     amLODIPine (NORVASC) 10 MG tablet     aspirin (ECOTRIN) 81 MG EC tablet Take 81 mg by mouth daily       atorvastatin (LIPITOR) 80 MG tablet     cetirizine (ZYRTEC) 10 MG tablet 10 mg daily Take by mouth daily prn    cholecalciferol, vitamin D3, 4,000 unit Cap Take 4,000 Units by mouth daily    fluticasone (FLONASE) 50 mcg/actuation nasal " spray 2 sprays by Nasal route nightly       furosemide (LASIX) 20 MG tablet Take 10 mg by mouth once daily.     lisinopril (PRINIVIL,ZESTRIL) 40 MG tablet Take 40 mg by mouth once daily.     metoprolol succinate (TOPROL-XL) 100 MG 24 hr tablet TAKE 1 TABLET ONE TIME DAILY    tamsulosin (FLOMAX) 0.4 mg Cp24 once daily.      No current facility-administered medications for this visit.        Review of Systems   Constitution: Negative. Negative for chills, decreased appetite, weakness, malaise/fatigue, night sweats, weight gain and weight loss.   Eyes: Negative.  Negative for blurred vision, double vision, visual disturbance and visual halos.   Cardiovascular: Negative for claudication, cyanosis, dyspnea on exertion, irregular heartbeat, leg swelling, near-syncope, orthopnea, paroxysmal nocturnal dyspnea and syncope.   Respiratory: Negative.  Negative for cough, hemoptysis, snoring, sputum production and wheezing.    Endocrine: Negative.  Negative for cold intolerance, heat intolerance, polydipsia and polyphagia.   Hematologic/Lymphatic: Negative for adenopathy and bleeding problem. Does not bruise/bleed easily.   Skin: Negative.  Negative for flushing, itching, poor wound healing and rash.   Musculoskeletal: Negative.  Negative for arthritis, back pain, falls, gout, joint pain, joint swelling, muscle cramps, muscle weakness, myalgias, neck pain and stiffness.   Gastrointestinal: Negative for bloating, abdominal pain, anorexia, diarrhea, dysphagia, excessive appetite, flatus, hematemesis, jaundice, melena and nausea.   Genitourinary: Negative for hesitancy and incomplete emptying.   Neurological: Negative for aphonia, brief paralysis, difficulty with concentration, disturbances in coordination, excessive daytime sleepiness, dizziness, focal weakness, light-headedness and loss of balance.   Psychiatric/Behavioral: Negative for altered mental status, depression, hallucinations, hypervigilance, memory loss, substance  abuse and suicidal ideas. The patient does not have insomnia and is not nervous/anxious.        Objective:   Physical Exam   Constitutional: He is oriented to person, place, and time. He appears well-developed and well-nourished. No distress.   HENT:   Head: Normocephalic and atraumatic.   Nose: Nose normal.   Mouth/Throat: No oropharyngeal exudate.   Eyes: Conjunctivae and EOM are normal. Pupils are equal, round, and reactive to light. Right eye exhibits no discharge. Left eye exhibits no discharge. No scleral icterus.   Neck: Normal range of motion. Neck supple. No JVD present. No tracheal deviation present. No thyromegaly present.   Cardiovascular: Normal rate, regular rhythm, normal heart sounds and intact distal pulses.  Exam reveals no gallop and no friction rub.    No murmur heard.  Pulmonary/Chest: Effort normal and breath sounds normal. No stridor. No respiratory distress. He has no wheezes. He has no rales. He exhibits no tenderness.   Abdominal: Soft. Bowel sounds are normal. He exhibits no distension and no mass. There is no tenderness.   Musculoskeletal: He exhibits no edema or tenderness.   Lymphadenopathy:     He has no cervical adenopathy.   Neurological: He is alert and oriented to person, place, and time. He displays normal reflexes. No cranial nerve deficit. He exhibits normal muscle tone. Coordination normal.   Skin: Skin is warm. No rash noted. He is not diaphoretic. No erythema. No pallor.   Psychiatric: He has a normal mood and affect. His behavior is normal. Judgment and thought content normal.       Assessment:     1. Essential hypertension    2. Presence of bare metal stent in right coronary artery    3. S/P angioplasty with stent    4. Diabetes mellitus screening    5. Hypercholesteremia    6. Metabolic syndrome    7. Other specified disorders of carbohydrate metabolism         Plan:   Start omega 3 if  Triglycerides are still high, discussed diet and exercise, mediterranean  diet  Counseled on importance of heart healthy diet low in saturated and trans fat and salt as well gradually starting a regular aerobic exercise regimen with goal of 30min 5x/week. Recommend BP diary. Call if systolic BP > 130 mmHg on checking repeatedly  All meds reviewed and are appropriate  Patient is compliant with his medications.    RTC 1 year, if TG still high will repeat lipid in 6 months after starting omega 3.    Tristan was seen today for hypertension.    Diagnoses and all orders for this visit:    Essential hypertension  -     Comprehensive metabolic panel; Future    Presence of bare metal stent in right coronary artery    S/P angioplasty with stent    Diabetes mellitus screening    Hypercholesteremia  -     Lipid panel; Future    Metabolic syndrome  -     HEMOGLOBIN A1C; Future    Other specified disorders of carbohydrate metabolism   -     HEMOGLOBIN A1C; Future

## 2018-06-18 ENCOUNTER — LAB VISIT (OUTPATIENT)
Dept: LAB | Facility: HOSPITAL | Age: 73
End: 2018-06-18
Attending: INTERNAL MEDICINE
Payer: MEDICARE

## 2018-06-18 ENCOUNTER — TELEPHONE (OUTPATIENT)
Dept: CARDIOLOGY | Facility: CLINIC | Age: 73
End: 2018-06-18

## 2018-06-18 DIAGNOSIS — E78.00 HYPERCHOLESTEREMIA: ICD-10-CM

## 2018-06-18 DIAGNOSIS — E88.810 METABOLIC SYNDROME: ICD-10-CM

## 2018-06-18 DIAGNOSIS — E74.89 OTHER SPECIFIED DISORDERS OF CARBOHYDRATE METABOLISM: ICD-10-CM

## 2018-06-18 DIAGNOSIS — I10 ESSENTIAL HYPERTENSION: ICD-10-CM

## 2018-06-18 LAB
ALBUMIN SERPL BCP-MCNC: 4.1 G/DL
ALP SERPL-CCNC: 61 U/L
ALT SERPL W/O P-5'-P-CCNC: 46 U/L
ANION GAP SERPL CALC-SCNC: 9 MMOL/L
AST SERPL-CCNC: 29 U/L
BILIRUB SERPL-MCNC: 1.4 MG/DL
BUN SERPL-MCNC: 19 MG/DL
CALCIUM SERPL-MCNC: 10.3 MG/DL
CHLORIDE SERPL-SCNC: 102 MMOL/L
CHOLEST SERPL-MCNC: 151 MG/DL
CHOLEST/HDLC SERPL: 4.2 {RATIO}
CO2 SERPL-SCNC: 28 MMOL/L
CREAT SERPL-MCNC: 1.2 MG/DL
EST. GFR  (AFRICAN AMERICAN): >60 ML/MIN/1.73 M^2
EST. GFR  (NON AFRICAN AMERICAN): >60 ML/MIN/1.73 M^2
ESTIMATED AVG GLUCOSE: 123 MG/DL
GLUCOSE SERPL-MCNC: 101 MG/DL
HBA1C MFR BLD HPLC: 5.9 %
HDLC SERPL-MCNC: 36 MG/DL
HDLC SERPL: 23.8 %
LDLC SERPL CALC-MCNC: 73.4 MG/DL
NONHDLC SERPL-MCNC: 115 MG/DL
POTASSIUM SERPL-SCNC: 4.2 MMOL/L
PROT SERPL-MCNC: 7.3 G/DL
SODIUM SERPL-SCNC: 139 MMOL/L
TRIGL SERPL-MCNC: 208 MG/DL

## 2018-06-18 PROCEDURE — 80061 LIPID PANEL: CPT

## 2018-06-18 PROCEDURE — 36415 COLL VENOUS BLD VENIPUNCTURE: CPT

## 2018-06-18 PROCEDURE — 80053 COMPREHEN METABOLIC PANEL: CPT

## 2018-06-18 PROCEDURE — 83036 HEMOGLOBIN GLYCOSYLATED A1C: CPT

## 2018-06-28 ENCOUNTER — TELEPHONE (OUTPATIENT)
Dept: ENDOSCOPY | Facility: HOSPITAL | Age: 73
End: 2018-06-28

## 2018-06-28 ENCOUNTER — OFFICE VISIT (OUTPATIENT)
Dept: GASTROENTEROLOGY | Facility: CLINIC | Age: 73
End: 2018-06-28
Payer: MEDICARE

## 2018-06-28 VITALS
BODY MASS INDEX: 35.77 KG/M2 | WEIGHT: 255.5 LBS | HEIGHT: 71 IN | DIASTOLIC BLOOD PRESSURE: 70 MMHG | SYSTOLIC BLOOD PRESSURE: 111 MMHG | HEART RATE: 62 BPM

## 2018-06-28 DIAGNOSIS — Z86.010 HISTORY OF COLON POLYPS: ICD-10-CM

## 2018-06-28 DIAGNOSIS — Z80.0 FAMILY HISTORY OF COLON CANCER: ICD-10-CM

## 2018-06-28 DIAGNOSIS — Z12.11 SPECIAL SCREENING FOR MALIGNANT NEOPLASMS, COLON: Primary | ICD-10-CM

## 2018-06-28 DIAGNOSIS — Z12.11 SCREEN FOR COLON CANCER: Primary | ICD-10-CM

## 2018-06-28 PROCEDURE — 99214 OFFICE O/P EST MOD 30 MIN: CPT | Mod: PBBFAC | Performed by: NURSE PRACTITIONER

## 2018-06-28 PROCEDURE — 99999 PR PBB SHADOW E&M-EST. PATIENT-LVL IV: CPT | Mod: PBBFAC,,, | Performed by: NURSE PRACTITIONER

## 2018-06-28 PROCEDURE — 99212 OFFICE O/P EST SF 10 MIN: CPT | Mod: S$PBB,,, | Performed by: NURSE PRACTITIONER

## 2018-06-28 RX ORDER — POLYETHYLENE GLYCOL 3350, SODIUM SULFATE ANHYDROUS, SODIUM BICARBONATE, SODIUM CHLORIDE, POTASSIUM CHLORIDE 236; 22.74; 6.74; 5.86; 2.97 G/4L; G/4L; G/4L; G/4L; G/4L
4 POWDER, FOR SOLUTION ORAL ONCE
Qty: 4000 ML | Refills: 0 | Status: SHIPPED | OUTPATIENT
Start: 2018-06-28 | End: 2018-06-28

## 2018-06-28 NOTE — PROGRESS NOTES
[facility] Note    Reason for Visit:  The primary encounter diagnosis was Screen for colon cancer. Diagnoses of History of colon polyps and Family history of colon cancer were also pertinent to this visit.    PCP:   Primary Doctor No   No address on file    Referring MD:  Aaareferral Self  No address on file    HPI:  This is a 72 y.o. male here for evaluation for a screening colonoscopy. Mr. Queen is from [place], [place] and is staying at a local apartment complex d/t his wife recently had a liver and kidney transplant. He was seen last year by me at which time colonoscopy was ordered, but pt did not have a chance to have it done at that time d/t helping with his wife's recovery.    Last awvusjvomnz-5-8 years ago- 2 polyps removed. 1st colonoscopy 3-4 years prior to the last-WNL  Family hx of colon cancer- brother with colon cancer dx at 60 years old. Living. Is s/p resection  Family hx of colon polyps-brother (cancer) and mother (benign)  Change in bowel habits- denies   Pencil thin stools- denies  Bowel habits - normal. 1-2 bristol type 4 BM/day  GI bleeding - denies hematochezia, hematemesis, melena, BRBPR, black/tarry stools, and coffee ground emesis    Abdominal pain - no  Reflux - no  Dysphagia/odynophagia - no   NSAID usage -81 mg ASA daily    ROS:  Constitutional: No fevers, no chills, No unintentional weight loss, no fatigue,   ENT: No allergies  CV: No chest pain, no palpitations, no perif. edema, no sob on exertion  Pulm: No cough, No shortness of breath, no wheezes, no sputum  Ophtho: No vision changes  GI: see HPI; also no nausea, no vomiting, no change in appetite  Derm: No rash  Heme: No lymphadenopathy, No bruising  MSK: No arthritis, no muscle pain, no muscle weakness  : No dysuria, No hematuria  Endo: No hot or cold intolerance  Neuro: No syncope, No seizure,       Medical History:  has a past medical history of Arthritis; Cataract; Coronary artery disease; Enlarged prostate;  "History of right hip replacement (02/2012); Hypertension; and Hypertensive retinopathy.    Surgical History:  has a past surgical history that includes Cardiac catheterization (2007); biscep surgery; and right hip replac.    Family History: family history includes Cataracts in his maternal grandmother and mother; Colon cancer (age of onset: 60) in his brother; Colon polyps in his brother and mother; Heart attack in his brother, father, and mother; Heart disease in his mother; Hypertension in his brother, father, and mother..     Social History:  reports that he has never smoked. He has never used smokeless tobacco. He reports that he does not drink alcohol or use drugs.    Review of patient's allergies indicates:  No Known Allergies    Current Outpatient Prescriptions   Medication Sig    allopurinol (ZYLOPRIM) 300 MG tablet Take 300 mg by mouth once daily.     amLODIPine (NORVASC) 10 MG tablet     aspirin (ECOTRIN) 81 MG EC tablet Take 81 mg by mouth daily       atorvastatin (LIPITOR) 80 MG tablet     cetirizine (ZYRTEC) 10 MG tablet 10 mg daily Take by mouth daily prn    cholecalciferol, vitamin D3, 4,000 unit Cap Take 4,000 Units by mouth daily    fluticasone (FLONASE) 50 mcg/actuation nasal spray 2 sprays by Nasal route nightly       furosemide (LASIX) 20 MG tablet Take 10 mg by mouth once daily.     lisinopril (PRINIVIL,ZESTRIL) 40 MG tablet Take 40 mg by mouth once daily.     metoprolol succinate (TOPROL-XL) 100 MG 24 hr tablet TAKE 1 TABLET ONE TIME DAILY    tamsulosin (FLOMAX) 0.4 mg Cp24 once daily.     polyethylene glycol (GOLYTELY,NULYTELY) 236-22.74-6.74 -5.86 gram suspension Take 4,000 mLs (4 L total) by mouth once. for 1 dose     No current facility-administered medications for this visit.        Objective Findings:    Vital Signs:  /70   Pulse 62   Ht 5' 11" (1.803 m)   Wt 115.9 kg (255 lb 8.2 oz)   BMI 35.64 kg/m²   Body mass index is 35.64 kg/m².    Physical Exam:  General " Appearance: Well appearing in no acute distress  Head:   Normocephalic, without obvious abnormality  Eyes:    No scleral icterus, EOMI  ENT: Neck supple, Lips, mucosa, and tongue normal; teeth and gums normal  Lungs: CTA bilaterally in anterior and posterior fields, no wheezes, no crackles.  Heart:  Regular rate and rhythm, S1, S2 normal, no murmurs heard  Abdomen: Soft, non tender, non distended with positive bowel sounds in all four quadrants. No hepatosplenomegaly, ascites, or mass  Extremities: 2+ radial pulses, no clubbing, cyanosis or edema  Skin: No rash to exposed areas  Neurologic: A&Ox4      Labs:  Lab Results   Component Value Date    WBC 7.26 03/23/2017    HGB 16.8 03/23/2017    HCT 46.4 03/23/2017     03/23/2017    CHOL 151 06/18/2018    TRIG 208 (H) 06/18/2018    HDL 36 (L) 06/18/2018    ALT 46 (H) 06/18/2018    AST 29 06/18/2018     06/18/2018    K 4.2 06/18/2018     06/18/2018    CREATININE 1.2 06/18/2018    BUN 19 06/18/2018    CO2 28 06/18/2018    HGBA1C 5.9 (H) 06/18/2018       Endoscopy:    EGD-none  Per pt 4-5 years ago in [place] Colonoscopy-2 polyps removed.  Per pt, 7-8 years ago in [place] Colonoscopy-WNL  Assessment:  1. Screen for colon cancer    2. History of colon polyps    3. Family history of colon cancer           Recommendations:  1,2,3. Screen for colon cancer- hx polyps, and family hx of colon cancer. High risk colonoscopy ordered.             Order summary:  Orders Placed This Encounter    Case request GI: COLONOSCOPY         Thank you so much for allowing me to participate in the care of Tristan Queen    Torri Vee, APRN, FNP-C

## 2021-08-24 ENCOUNTER — IMMUNIZATION (OUTPATIENT)
Dept: FAMILY MEDICINE | Facility: CLINIC | Age: 76
End: 2021-08-24
Payer: MEDICARE

## 2021-08-24 DIAGNOSIS — Z23 NEED FOR VACCINATION: Primary | ICD-10-CM

## 2021-08-24 PROCEDURE — 91301 COVID-19, MRNA, LNP-S, PF, 100 MCG/0.5 ML DOSE VACCINE: ICD-10-PCS | Mod: S$GLB,,, | Performed by: FAMILY MEDICINE

## 2021-08-24 PROCEDURE — 0011A COVID-19, MRNA, LNP-S, PF, 100 MCG/0.5 ML DOSE VACCINE: CPT | Mod: CV19,S$GLB,, | Performed by: FAMILY MEDICINE

## 2021-08-24 PROCEDURE — 91301 COVID-19, MRNA, LNP-S, PF, 100 MCG/0.5 ML DOSE VACCINE: CPT | Mod: S$GLB,,, | Performed by: FAMILY MEDICINE

## 2021-08-24 PROCEDURE — 0011A COVID-19, MRNA, LNP-S, PF, 100 MCG/0.5 ML DOSE VACCINE: ICD-10-PCS | Mod: CV19,S$GLB,, | Performed by: FAMILY MEDICINE

## 2021-09-20 ENCOUNTER — IMMUNIZATION (OUTPATIENT)
Dept: FAMILY MEDICINE | Facility: CLINIC | Age: 76
End: 2021-09-20
Payer: MEDICARE

## 2021-09-20 DIAGNOSIS — Z23 NEED FOR VACCINATION: Primary | ICD-10-CM

## 2021-09-20 PROCEDURE — 0012A COVID-19, MRNA, LNP-S, PF, 100 MCG/0.5 ML DOSE VACCINE: CPT | Mod: PBBFAC

## 2024-07-16 ENCOUNTER — TELEPHONE (OUTPATIENT)
Dept: FAMILY MEDICINE | Facility: CLINIC | Age: 79
End: 2024-07-16
Payer: COMMERCIAL

## 2024-07-16 NOTE — TELEPHONE ENCOUNTER
----- Message from Kat De Oliveira sent at 7/16/2024 11:25 AM CDT -----  Type: Needs Medical Advice  Who Called:  pt's  Tristan  Best Call Back Number: [phone]  Additional Information: Tristan is calling in regards to the medication that was supposed to be sent to the pharmacy yesterday for the pt. Needs to get a status update as the pharmacy has not received it yet. Please call back and advise. Thanks!